# Patient Record
Sex: MALE | Race: WHITE | Employment: OTHER | URBAN - METROPOLITAN AREA
[De-identification: names, ages, dates, MRNs, and addresses within clinical notes are randomized per-mention and may not be internally consistent; named-entity substitution may affect disease eponyms.]

---

## 2020-06-03 ENCOUNTER — HOSPITAL ENCOUNTER (OUTPATIENT)
Age: 65
Discharge: HOME | End: 2020-06-03
Attending: EMERGENCY MEDICINE
Payer: MEDICARE

## 2020-06-03 VITALS
HEART RATE: 75 BPM | TEMPERATURE: 98 F | SYSTOLIC BLOOD PRESSURE: 114 MMHG | OXYGEN SATURATION: 96 % | RESPIRATION RATE: 16 BRPM | DIASTOLIC BLOOD PRESSURE: 80 MMHG

## 2020-06-03 DIAGNOSIS — H61.23 BILATERAL IMPACTED CERUMEN: Primary | ICD-10-CM

## 2020-06-03 DIAGNOSIS — S61.214A LACERATION OF RIGHT RING FINGER WITHOUT FOREIGN BODY WITHOUT DAMAGE TO NAIL, INITIAL ENCOUNTER: ICD-10-CM

## 2020-06-03 PROCEDURE — 69210 REMOVE IMPACTED EAR WAX UNI: CPT

## 2020-06-03 NOTE — ED PROVIDER NOTES
Patient Seen in: [facility] Immediate Care In [address] [place] [address]      History   Patient presents with:  Ear Problem Pain    Stated Complaint: EAR PAIN    HPI  Patient wears bilateral hearing aids and complains they do not seem to be working as well.   He wou Conjunctivae normal.   Neck:      Musculoskeletal: Normal range of motion and neck supple. Pulmonary:      Effort: Pulmonary effort is normal.   Musculoskeletal: Normal range of motion. Lymphadenopathy:      Cervical: No cervical adenopathy.    Skin:

## 2020-06-04 ENCOUNTER — TELEPHONE (OUTPATIENT)
Dept: OTOLARYNGOLOGY | Facility: CLINIC | Age: 65
End: 2020-06-04

## 2020-06-04 NOTE — TELEPHONE ENCOUNTER
Pt's wife called. Pt went to urgent care 6-3-20 for bilateral impacted cerumen. Pt can not hear. Pt does wear hearing aids. Caller requesting an appointment as soon as possible with any doctor.   Call

## 2020-06-08 ENCOUNTER — OFFICE VISIT (OUTPATIENT)
Dept: OTOLARYNGOLOGY | Facility: CLINIC | Age: 65
End: 2020-06-08
Payer: MEDICARE

## 2020-06-08 VITALS
SYSTOLIC BLOOD PRESSURE: 149 MMHG | WEIGHT: 300 LBS | TEMPERATURE: 97 F | DIASTOLIC BLOOD PRESSURE: 89 MMHG | HEIGHT: 69 IN | BODY MASS INDEX: 44.43 KG/M2

## 2020-06-08 DIAGNOSIS — H61.23 BILATERAL IMPACTED CERUMEN: Primary | ICD-10-CM

## 2020-06-08 DIAGNOSIS — H60.331 ACUTE SWIMMER'S EAR OF RIGHT SIDE: ICD-10-CM

## 2020-06-08 PROCEDURE — 69210 REMOVE IMPACTED EAR WAX UNI: CPT | Performed by: OTOLARYNGOLOGY

## 2020-06-08 PROCEDURE — G0463 HOSPITAL OUTPT CLINIC VISIT: HCPCS | Performed by: OTOLARYNGOLOGY

## 2020-06-08 PROCEDURE — 99203 OFFICE O/P NEW LOW 30 MIN: CPT | Performed by: OTOLARYNGOLOGY

## 2020-06-08 RX ORDER — NEOMYCIN SULFATE, POLYMYXIN B SULFATE AND HYDROCORTISONE 10; 3.5; 1 MG/ML; MG/ML; [USP'U]/ML
3 SUSPENSION/ DROPS AURICULAR (OTIC) 3 TIMES DAILY
Qty: 1 BOTTLE | Refills: 0 | Status: SHIPPED | OUTPATIENT
Start: 2020-06-08 | End: 2020-06-15

## 2020-06-08 NOTE — PROGRESS NOTES
Jenna Barrera is a 72year old male. Patient presents with:  Ear Wax: both ears      HISTORY OF PRESENT ILLNESS  6/8/2020  Patient  presents with ear pain in the right ears for4 days.   His hearing aids were not working well so he went into an immediate abused: Not on file        Physically abused: Not on file        Forced sexual activity: Not on file    Other Topics      Concerns:        Not on file    Social History Narrative      Not on file      No family history on file.   No past medical history on to be normal. Patient tolerated the procedure well. All questions were answered. Current Outpatient Medications:   •  Neomycin-Polymyxin-HC 3.5-53100-4 Otic Suspension, Place 3 drops into the right ear 3 (three) times daily for 7 days. , Disp: 1 [facility]

## 2020-07-23 ENCOUNTER — OFFICE VISIT (OUTPATIENT)
Dept: INTERNAL MEDICINE CLINIC | Facility: CLINIC | Age: 65
End: 2020-07-23
Payer: MEDICARE

## 2020-07-23 VITALS
OXYGEN SATURATION: 99 % | BODY MASS INDEX: 46.89 KG/M2 | HEIGHT: 68.4 IN | TEMPERATURE: 98 F | HEART RATE: 72 BPM | SYSTOLIC BLOOD PRESSURE: 124 MMHG | DIASTOLIC BLOOD PRESSURE: 70 MMHG | WEIGHT: 313 LBS

## 2020-07-23 DIAGNOSIS — Z00.00 ANNUAL PHYSICAL EXAM: Primary | ICD-10-CM

## 2020-07-23 DIAGNOSIS — R53.83 FATIGUE, UNSPECIFIED TYPE: ICD-10-CM

## 2020-07-23 DIAGNOSIS — E66.01 CLASS 3 SEVERE OBESITY DUE TO EXCESS CALORIES WITHOUT SERIOUS COMORBIDITY WITH BODY MASS INDEX (BMI) OF 45.0 TO 49.9 IN ADULT (HCC): ICD-10-CM

## 2020-07-23 DIAGNOSIS — J30.2 SEASONAL ALLERGIES: ICD-10-CM

## 2020-07-23 DIAGNOSIS — Z12.5 PROSTATE CANCER SCREENING: ICD-10-CM

## 2020-07-23 DIAGNOSIS — N43.3 RIGHT HYDROCELE: ICD-10-CM

## 2020-07-23 PROBLEM — E66.813 CLASS 3 SEVERE OBESITY DUE TO EXCESS CALORIES WITHOUT SERIOUS COMORBIDITY WITH BODY MASS INDEX (BMI) OF 45.0 TO 49.9 IN ADULT (HCC): Status: ACTIVE | Noted: 2020-07-23

## 2020-07-23 PROBLEM — E66.813 CLASS 3 SEVERE OBESITY DUE TO EXCESS CALORIES WITHOUT SERIOUS COMORBIDITY WITH BODY MASS INDEX (BMI) OF 45.0 TO 49.9 IN ADULT: Status: ACTIVE | Noted: 2020-07-23

## 2020-07-23 LAB
OCCULT BLOOD, STOOL 1: NEGATIVE
PERFORMANCE MONITORS CORRECT (YES/NO): YES YES/NO

## 2020-07-23 PROCEDURE — 82272 OCCULT BLD FECES 1-3 TESTS: CPT | Performed by: INTERNAL MEDICINE

## 2020-07-23 PROCEDURE — G0463 HOSPITAL OUTPT CLINIC VISIT: HCPCS | Performed by: INTERNAL MEDICINE

## 2020-07-23 PROCEDURE — 99204 OFFICE O/P NEW MOD 45 MIN: CPT | Performed by: INTERNAL MEDICINE

## 2020-07-23 PROCEDURE — G0402 INITIAL PREVENTIVE EXAM: HCPCS | Performed by: INTERNAL MEDICINE

## 2020-07-23 RX ORDER — MULTIVIT WITH MINERALS/LUTEIN
1000 TABLET ORAL DAILY
COMMUNITY

## 2020-07-23 NOTE — PATIENT INSTRUCTIONS
1.  Patient is to continue his current diet, medication and activities. 2.  Patient is to watch his diet more closely, continue to get some exercise and attempt to lose weight.   3.  I will plan to see the patient back in 1 to 2 months with blood tests, ur

## 2020-07-24 NOTE — PROGRESS NOTES
Tracy Guzman is a 72year old male who presents for a complete physical exam.   HPI:   Mr Dennie Simmers. [place] is a 71 y/o white male who was seen by me on July 22, 2020 for his initial Medicare Annual Physical exam.  Pt feels that he has not had a physic GASTRIC BYPASS,OBESE<100CM STEPHANI-EN-Y  2010      Family History   Problem Relation Age of Onset   • Stroke Father    • Cancer Mother    • Dementia Mother    • Dementia Maternal Grandfather    • Depression Paternal Grandmother    • Other (Other) Brother nodules  MUSCULOSKELETAL: back is not tender. No pain or swelling of legs  EXTREMITIES:No edema. All peripheral pulses are intact. NEURO:Alert and oriented, CN are intact, DTRs are 1+ Bilaterally with absent Achilles reflexes bilaterally.     Pt will hav Fall/Risk Assessment          Have you fallen in the last 12 months?: 0-No(3 weeks ago)                                        Fall/Risk Scorin          Depression Screening (PHQ-2/PHQ-9): Over the LAST 2 WEEKS                      Advance Directiv Influenza No orders found for this or any previous visit. Update Immunization Activity if applicable    Pneumococcal No orders found for this or any previous visit.  Update Immunization Activity if applicable    Hepatitis B No orders found for this or any p

## 2020-08-25 ENCOUNTER — LAB ENCOUNTER (OUTPATIENT)
Dept: LAB | Age: 65
End: 2020-08-25
Attending: INTERNAL MEDICINE
Payer: MEDICARE

## 2020-08-25 DIAGNOSIS — R53.83 FATIGUE, UNSPECIFIED TYPE: ICD-10-CM

## 2020-08-25 DIAGNOSIS — E66.01 CLASS 3 SEVERE OBESITY DUE TO EXCESS CALORIES WITHOUT SERIOUS COMORBIDITY WITH BODY MASS INDEX (BMI) OF 45.0 TO 49.9 IN ADULT (HCC): ICD-10-CM

## 2020-08-25 DIAGNOSIS — Z12.5 PROSTATE CANCER SCREENING: ICD-10-CM

## 2020-08-25 DIAGNOSIS — Z00.00 ANNUAL PHYSICAL EXAM: ICD-10-CM

## 2020-08-25 LAB
ALBUMIN SERPL-MCNC: 3.6 G/DL (ref 3.4–5)
ALBUMIN/GLOB SERPL: 1 {RATIO} (ref 1–2)
ALP LIVER SERPL-CCNC: 79 U/L (ref 45–117)
ALT SERPL-CCNC: 27 U/L (ref 16–61)
ANION GAP SERPL CALC-SCNC: 2 MMOL/L (ref 0–18)
AST SERPL-CCNC: 20 U/L (ref 15–37)
BASOPHILS # BLD AUTO: 0.02 X10(3) UL (ref 0–0.2)
BASOPHILS NFR BLD AUTO: 0.4 %
BILIRUB SERPL-MCNC: 0.4 MG/DL (ref 0.1–2)
BILIRUB UR QL: NEGATIVE
BUN BLD-MCNC: 14 MG/DL (ref 7–18)
BUN/CREAT SERPL: 18.7 (ref 10–20)
CALCIUM BLD-MCNC: 8.4 MG/DL (ref 8.5–10.1)
CHLORIDE SERPL-SCNC: 109 MMOL/L (ref 98–112)
CHOLEST SMN-MCNC: 119 MG/DL (ref ?–200)
CLARITY UR: CLEAR
CO2 SERPL-SCNC: 29 MMOL/L (ref 21–32)
COLOR UR: YELLOW
COMPLEXED PSA SERPL-MCNC: 0.82 NG/ML (ref ?–4)
CREAT BLD-MCNC: 0.75 MG/DL (ref 0.7–1.3)
DEPRECATED RDW RBC AUTO: 41.2 FL (ref 35.1–46.3)
EOSINOPHIL # BLD AUTO: 0.14 X10(3) UL (ref 0–0.7)
EOSINOPHIL NFR BLD AUTO: 2.8 %
ERYTHROCYTE [DISTWIDTH] IN BLOOD BY AUTOMATED COUNT: 14 % (ref 11–15)
GLOBULIN PLAS-MCNC: 3.5 G/DL (ref 2.8–4.4)
GLUCOSE BLD-MCNC: 86 MG/DL (ref 70–99)
GLUCOSE UR-MCNC: NEGATIVE MG/DL
HCT VFR BLD AUTO: 35.8 % (ref 39–53)
HDLC SERPL-MCNC: 47 MG/DL (ref 40–59)
HGB BLD-MCNC: 11.1 G/DL (ref 13–17.5)
HGB UR QL STRIP.AUTO: NEGATIVE
IMM GRANULOCYTES # BLD AUTO: 0.02 X10(3) UL (ref 0–1)
IMM GRANULOCYTES NFR BLD: 0.4 %
KETONES UR-MCNC: NEGATIVE MG/DL
LDLC SERPL CALC-MCNC: 53 MG/DL (ref ?–100)
LEUKOCYTE ESTERASE UR QL STRIP.AUTO: NEGATIVE
LYMPHOCYTES # BLD AUTO: 1.77 X10(3) UL (ref 1–4)
LYMPHOCYTES NFR BLD AUTO: 35.4 %
M PROTEIN MFR SERPL ELPH: 7.1 G/DL (ref 6.4–8.2)
MCH RBC QN AUTO: 25.1 PG (ref 26–34)
MCHC RBC AUTO-ENTMCNC: 31 G/DL (ref 31–37)
MCV RBC AUTO: 80.8 FL (ref 80–100)
MONOCYTES # BLD AUTO: 0.58 X10(3) UL (ref 0.1–1)
MONOCYTES NFR BLD AUTO: 11.6 %
NEUTROPHILS # BLD AUTO: 2.47 X10 (3) UL (ref 1.5–7.7)
NEUTROPHILS # BLD AUTO: 2.47 X10(3) UL (ref 1.5–7.7)
NEUTROPHILS NFR BLD AUTO: 49.4 %
NITRITE UR QL STRIP.AUTO: NEGATIVE
NONHDLC SERPL-MCNC: 72 MG/DL (ref ?–130)
OSMOLALITY SERPL CALC.SUM OF ELEC: 290 MOSM/KG (ref 275–295)
PATIENT FASTING Y/N/NP: YES
PATIENT FASTING Y/N/NP: YES
PH UR: 6 [PH] (ref 5–8)
PLATELET # BLD AUTO: 229 10(3)UL (ref 150–450)
POTASSIUM SERPL-SCNC: 4.2 MMOL/L (ref 3.5–5.1)
PROT UR-MCNC: NEGATIVE MG/DL
RBC # BLD AUTO: 4.43 X10(6)UL (ref 3.8–5.8)
SODIUM SERPL-SCNC: 140 MMOL/L (ref 136–145)
SP GR UR STRIP: 1.01 (ref 1–1.03)
TRIGL SERPL-MCNC: 95 MG/DL (ref 30–149)
TSI SER-ACNC: 2.21 MIU/ML (ref 0.36–3.74)
UROBILINOGEN UR STRIP-ACNC: <2
VLDLC SERPL CALC-MCNC: 19 MG/DL (ref 0–30)
WBC # BLD AUTO: 5 X10(3) UL (ref 4–11)

## 2020-08-25 PROCEDURE — 80061 LIPID PANEL: CPT

## 2020-08-25 PROCEDURE — 80053 COMPREHEN METABOLIC PANEL: CPT

## 2020-08-25 PROCEDURE — 81003 URINALYSIS AUTO W/O SCOPE: CPT | Performed by: INTERNAL MEDICINE

## 2020-08-25 PROCEDURE — 85025 COMPLETE CBC W/AUTO DIFF WBC: CPT

## 2020-08-25 PROCEDURE — 84443 ASSAY THYROID STIM HORMONE: CPT

## 2020-08-25 PROCEDURE — 36415 COLL VENOUS BLD VENIPUNCTURE: CPT

## 2020-08-26 ENCOUNTER — LAB ENCOUNTER (OUTPATIENT)
Dept: LAB | Age: 65
End: 2020-08-26
Attending: INTERNAL MEDICINE
Payer: MEDICARE

## 2020-08-26 ENCOUNTER — OFFICE VISIT (OUTPATIENT)
Dept: INTERNAL MEDICINE CLINIC | Facility: CLINIC | Age: 65
End: 2020-08-26
Payer: MEDICARE

## 2020-08-26 VITALS
TEMPERATURE: 99 F | DIASTOLIC BLOOD PRESSURE: 76 MMHG | HEART RATE: 64 BPM | SYSTOLIC BLOOD PRESSURE: 120 MMHG | WEIGHT: 315 LBS | BODY MASS INDEX: 47.74 KG/M2 | HEIGHT: 68 IN | OXYGEN SATURATION: 99 %

## 2020-08-26 DIAGNOSIS — Z00.00 ANNUAL PHYSICAL EXAM: ICD-10-CM

## 2020-08-26 DIAGNOSIS — J30.2 SEASONAL ALLERGIES: ICD-10-CM

## 2020-08-26 DIAGNOSIS — D64.9 ANEMIA, UNSPECIFIED TYPE: ICD-10-CM

## 2020-08-26 DIAGNOSIS — E66.01 CLASS 3 SEVERE OBESITY DUE TO EXCESS CALORIES WITHOUT SERIOUS COMORBIDITY WITH BODY MASS INDEX (BMI) OF 45.0 TO 49.9 IN ADULT (HCC): Primary | ICD-10-CM

## 2020-08-26 DIAGNOSIS — R53.83 FATIGUE, UNSPECIFIED TYPE: ICD-10-CM

## 2020-08-26 DIAGNOSIS — N43.3 RIGHT HYDROCELE: ICD-10-CM

## 2020-08-26 LAB
BASOPHILS # BLD AUTO: 0.05 X10(3) UL (ref 0–0.2)
BASOPHILS NFR BLD AUTO: 0.7 %
DEPRECATED HBV CORE AB SER IA-ACNC: 6.4 NG/ML (ref 30–530)
DEPRECATED RDW RBC AUTO: 41.9 FL (ref 35.1–46.3)
EOSINOPHIL # BLD AUTO: 0.11 X10(3) UL (ref 0–0.7)
EOSINOPHIL NFR BLD AUTO: 1.6 %
ERYTHROCYTE [DISTWIDTH] IN BLOOD BY AUTOMATED COUNT: 14.3 % (ref 11–15)
FOLATE SERPL-MCNC: 12.6 NG/ML (ref 8.7–?)
HCT VFR BLD AUTO: 37.6 % (ref 39–53)
HGB BLD-MCNC: 11.7 G/DL (ref 13–17.5)
IMM GRANULOCYTES # BLD AUTO: 0.03 X10(3) UL (ref 0–1)
IMM GRANULOCYTES NFR BLD: 0.4 %
IRON SATURATION: 7 % (ref 20–50)
IRON SERPL-MCNC: 33 UG/DL (ref 65–175)
LYMPHOCYTES # BLD AUTO: 2.55 X10(3) UL (ref 1–4)
LYMPHOCYTES NFR BLD AUTO: 38.1 %
MCH RBC QN AUTO: 25.3 PG (ref 26–34)
MCHC RBC AUTO-ENTMCNC: 31.1 G/DL (ref 31–37)
MCV RBC AUTO: 81.2 FL (ref 80–100)
MONOCYTES # BLD AUTO: 0.69 X10(3) UL (ref 0.1–1)
MONOCYTES NFR BLD AUTO: 10.3 %
NEUTROPHILS # BLD AUTO: 3.27 X10 (3) UL (ref 1.5–7.7)
NEUTROPHILS # BLD AUTO: 3.27 X10(3) UL (ref 1.5–7.7)
NEUTROPHILS NFR BLD AUTO: 48.9 %
PLATELET # BLD AUTO: 231 10(3)UL (ref 150–450)
RBC # BLD AUTO: 4.63 X10(6)UL (ref 3.8–5.8)
TOTAL IRON BINDING CAPACITY: 463 UG/DL (ref 240–450)
TRANSFERRIN SERPL-MCNC: 311 MG/DL (ref 200–360)
VIT B12 SERPL-MCNC: 174 PG/ML (ref 193–986)
WBC # BLD AUTO: 6.7 X10(3) UL (ref 4–11)

## 2020-08-26 PROCEDURE — 85025 COMPLETE CBC W/AUTO DIFF WBC: CPT

## 2020-08-26 PROCEDURE — 36415 COLL VENOUS BLD VENIPUNCTURE: CPT

## 2020-08-26 PROCEDURE — 93010 ELECTROCARDIOGRAM REPORT: CPT | Performed by: INTERNAL MEDICINE

## 2020-08-26 PROCEDURE — 84466 ASSAY OF TRANSFERRIN: CPT

## 2020-08-26 PROCEDURE — G0463 HOSPITAL OUTPT CLINIC VISIT: HCPCS | Performed by: INTERNAL MEDICINE

## 2020-08-26 PROCEDURE — 82607 VITAMIN B-12: CPT

## 2020-08-26 PROCEDURE — 99214 OFFICE O/P EST MOD 30 MIN: CPT | Performed by: INTERNAL MEDICINE

## 2020-08-26 PROCEDURE — 82728 ASSAY OF FERRITIN: CPT

## 2020-08-26 PROCEDURE — 83540 ASSAY OF IRON: CPT

## 2020-08-26 PROCEDURE — 93005 ELECTROCARDIOGRAM TRACING: CPT | Performed by: INTERNAL MEDICINE

## 2020-08-26 PROCEDURE — 82746 ASSAY OF FOLIC ACID SERUM: CPT

## 2020-08-26 NOTE — PATIENT INSTRUCTIONS
1.  Patient is to continue his current diet, medication and activity. 2.  I will obtain blood test to check on the patient's anemia which will include a CBC, iron, iron-binding capacity, ferritin level, G26 level and folic acid level.   I will discuss the

## 2020-08-26 NOTE — PROGRESS NOTES
Izzy Flower is a 72year old male. Patient presents with:  Checkup  Fatigue    HPI:   Patient presents with:  Checkup  Fatigue    Pt is here for follow up evaluation.   Pt was seen last month for his initial Medicare Annual Physical.  Pt is seen today f organomegaly or palpable masses  EXTREMITIES: no edema  NEURO: alert and oriented  ASSESSMENT AND PLAN:   1. Class 3 severe obesity due to excess calories without serious comorbidity with body mass index (BMI) of 45.0 to 49.9 in adult (HCC)  Stable.   CPM. shown his hemoglobin to be 11.1, hematocrit to be 35.8 and WBC to be 5000. Patient's platelet count was 2 and 29,000.   Patient's CMP was normal.  Patient's urinalysis was normal.  Patient's lipid panel had a cholesterol 119, triglycerides were 95, HDL cho

## 2021-01-18 ENCOUNTER — TELEPHONE (OUTPATIENT)
Dept: INTERNAL MEDICINE CLINIC | Facility: CLINIC | Age: 66
End: 2021-01-18

## 2021-01-18 NOTE — TELEPHONE ENCOUNTER
Telephone call to patient and message left. I left a message for the patient that he had blood tests last August which showed him to have an anemia which appeared to be an iron deficiency anemia. Also his vitamin B12 level is low.   I had previously recom

## 2021-03-13 DIAGNOSIS — Z23 NEED FOR VACCINATION: ICD-10-CM

## 2022-03-16 ENCOUNTER — TELEPHONE (OUTPATIENT)
Dept: INTERNAL MEDICINE CLINIC | Facility: CLINIC | Age: 67
End: 2022-03-16

## 2022-03-16 DIAGNOSIS — Z12.11 COLON CANCER SCREENING: Primary | ICD-10-CM

## 2022-10-20 ENCOUNTER — HOSPITAL ENCOUNTER (OUTPATIENT)
Age: 67
Discharge: HOME | End: 2022-10-20
Payer: MEDICARE

## 2022-10-20 VITALS
HEIGHT: 68 IN | OXYGEN SATURATION: 99 % | RESPIRATION RATE: 18 BRPM | SYSTOLIC BLOOD PRESSURE: 149 MMHG | WEIGHT: 315 LBS | TEMPERATURE: 98 F | DIASTOLIC BLOOD PRESSURE: 66 MMHG | BODY MASS INDEX: 47.74 KG/M2 | HEART RATE: 98 BPM

## 2022-10-20 DIAGNOSIS — R09.81 NASAL CONGESTION: Primary | ICD-10-CM

## 2022-10-20 DIAGNOSIS — H92.01 EAR DISCOMFORT, RIGHT: ICD-10-CM

## 2022-10-20 PROCEDURE — 99203 OFFICE O/P NEW LOW 30 MIN: CPT | Performed by: PHYSICIAN ASSISTANT

## 2022-10-20 RX ORDER — AMOXICILLIN AND CLAVULANATE POTASSIUM 875; 125 MG/1; MG/1
1 TABLET, FILM COATED ORAL 2 TIMES DAILY
Qty: 20 TABLET | Refills: 0 | Status: SHIPPED | OUTPATIENT
Start: 2022-10-22 | End: 2022-11-01

## 2022-10-20 RX ORDER — FLUTICASONE PROPIONATE 50 MCG
2 SPRAY, SUSPENSION (ML) NASAL DAILY
Qty: 16 G | Refills: 0 | Status: SHIPPED | OUTPATIENT
Start: 2022-10-20 | End: 2022-11-19

## 2022-10-20 RX ORDER — FEXOFENADINE HYDROCHLORIDE 60 MG/1
60 TABLET, FILM COATED ORAL 2 TIMES DAILY
Qty: 20 TABLET | Refills: 0 | Status: SHIPPED | OUTPATIENT
Start: 2022-10-20 | End: 2022-10-30

## 2022-11-21 ENCOUNTER — TELEPHONE (OUTPATIENT)
Dept: INTERNAL MEDICINE CLINIC | Facility: CLINIC | Age: 67
End: 2022-11-21

## 2022-11-21 NOTE — TELEPHONE ENCOUNTER
To nursing-  Because he reported difficulty breathing, he needs to go to ER today. He should not wait till tomorrow especially since he is in the post COVID time where complications can happen pulmonary and cardiac wise that he should be evaluated today. Thanks. patient has not seen Dr. Karen Diaz since 2020.

## 2022-11-21 NOTE — TELEPHONE ENCOUNTER
Wife called  Pt needs to be seen for vertigo    Pt is dizzy when climbing stairs and has difficulty breathing & sleeping    Can't sleep due to breathing issues    Pt was seen in the ER, completed antibiotics that were prescribed - he is not any better     Please call wife, Enma Tabares to advise on appointment or next steps  [phone]

## 2022-11-21 NOTE — TELEPHONE ENCOUNTER
To Dr. Twyla Lundborg to please advise----    Spoke with pt Myles [place] not listed on HIPAA). Confirmed that he does not want us to share medical information with her, states she is his ex-wife now. Reports he had covid about 2 months ago, his only symptom was fever. Then developed ear infection and sinusitis. Was seen at [facility] 10/20/22 and pt completed augmentin. Reports symptoms did not resolve. Still having nasal congestion, right ear pain/fullness, and dizziness. Dizziness has been ongoing since UC visit. No known history of vertigo. Reports he feels like he is moving even when sitting. Makes his stomach feel upset at times. Denies any blurred vision, arm pain, chest pain, or SOB. Having difficulty sleeping. Taking cetirizine. Advised that pt should be seen. Minimal openings in office this week. He is a pt of Dr. Sania Stearns. Dr. Twyla Lundborg please advise if you are ok with seeing pt tomorrow in Dr. Sania Stearns absence. Scheduled for 2:30 to hold spot. Nursing to notify pt if appt is ok, or cancel appt if not. Notified patient that we will route this message to the doctor and see what their recommendations would be. In the meantime, if anything worsens, they were advised to call back or seek emergent evaluation.

## 2022-11-21 NOTE — TELEPHONE ENCOUNTER
Please advise -Called patient and daughter per hipaa and relayed DR. BLANC message - spoke with patient and relayed DR. BLANC message - patient not sure if he will go to ER - do you want to schedule an primo. ?- to DR. Sarina Yanez

## 2022-11-22 ENCOUNTER — OFFICE VISIT (OUTPATIENT)
Dept: INTERNAL MEDICINE CLINIC | Facility: CLINIC | Age: 67
End: 2022-11-22
Payer: MEDICARE

## 2022-11-22 VITALS
OXYGEN SATURATION: 96 % | HEART RATE: 104 BPM | HEIGHT: 68 IN | DIASTOLIC BLOOD PRESSURE: 80 MMHG | BODY MASS INDEX: 47.74 KG/M2 | SYSTOLIC BLOOD PRESSURE: 140 MMHG | WEIGHT: 315 LBS

## 2022-11-22 DIAGNOSIS — H72.91 SEROUS OTITIS MEDIA OF RIGHT EAR WITH RUPTURE OF TYMPANIC MEMBRANE: Primary | ICD-10-CM

## 2022-11-22 DIAGNOSIS — R42 DYSEQUILIBRIUM: ICD-10-CM

## 2022-11-22 DIAGNOSIS — H65.91 SEROUS OTITIS MEDIA OF RIGHT EAR WITH RUPTURE OF TYMPANIC MEMBRANE: Primary | ICD-10-CM

## 2022-11-22 PROCEDURE — 99213 OFFICE O/P EST LOW 20 MIN: CPT | Performed by: INTERNAL MEDICINE

## 2022-11-22 RX ORDER — PREDNISONE 20 MG/1
40 TABLET ORAL DAILY
Qty: 10 TABLET | Refills: 0 | Status: SHIPPED | OUTPATIENT
Start: 2022-11-22 | End: 2022-11-27

## 2022-11-28 ENCOUNTER — TELEPHONE (OUTPATIENT)
Dept: INTERNAL MEDICINE CLINIC | Facility: CLINIC | Age: 67
End: 2022-11-28

## 2022-11-28 NOTE — TELEPHONE ENCOUNTER
Please call patient wife  They are looking for recommendation for an ENT  Pt was seen in office last week, was told if condition did not improve with medication.  call office for recommendation for ENT  Tasked to nursing

## 2022-11-29 ENCOUNTER — OFFICE VISIT (OUTPATIENT)
Dept: OTOLARYNGOLOGY | Facility: CLINIC | Age: 67
End: 2022-11-29
Payer: MEDICARE

## 2022-11-29 VITALS — HEIGHT: 68 IN | WEIGHT: 315 LBS | TEMPERATURE: 99 F | BODY MASS INDEX: 47.74 KG/M2

## 2022-11-29 DIAGNOSIS — R42 VERTIGO: Primary | ICD-10-CM

## 2022-11-29 DIAGNOSIS — M26.609 TMJ (TEMPOROMANDIBULAR JOINT DISORDER): ICD-10-CM

## 2022-11-29 PROCEDURE — 92504 EAR MICROSCOPY EXAMINATION: CPT | Performed by: STUDENT IN AN ORGANIZED HEALTH CARE EDUCATION/TRAINING PROGRAM

## 2022-11-29 PROCEDURE — 99214 OFFICE O/P EST MOD 30 MIN: CPT | Performed by: STUDENT IN AN ORGANIZED HEALTH CARE EDUCATION/TRAINING PROGRAM

## 2023-02-10 ENCOUNTER — PATIENT MESSAGE (OUTPATIENT)
Dept: INTERNAL MEDICINE CLINIC | Facility: CLINIC | Age: 68
End: 2023-02-10

## 2023-02-20 ENCOUNTER — PATIENT OUTREACH (OUTPATIENT)
Dept: INTERNAL MEDICINE CLINIC | Facility: CLINIC | Age: 68
End: 2023-02-20

## 2023-08-18 ENCOUNTER — HOSPITAL ENCOUNTER (EMERGENCY)
Facility: HOSPITAL | Age: 68
Discharge: HOME | End: 2023-08-18
Attending: EMERGENCY MEDICINE
Payer: MEDICARE

## 2023-08-18 ENCOUNTER — TELEPHONE (OUTPATIENT)
Dept: INTERNAL MEDICINE CLINIC | Facility: CLINIC | Age: 68
End: 2023-08-18

## 2023-08-18 ENCOUNTER — APPOINTMENT (OUTPATIENT)
Dept: CT IMAGING | Facility: HOSPITAL | Age: 68
End: 2023-08-18
Attending: EMERGENCY MEDICINE
Payer: MEDICARE

## 2023-08-18 ENCOUNTER — APPOINTMENT (OUTPATIENT)
Dept: MRI IMAGING | Facility: HOSPITAL | Age: 68
End: 2023-08-18
Attending: EMERGENCY MEDICINE
Payer: MEDICARE

## 2023-08-18 VITALS
OXYGEN SATURATION: 98 % | BODY MASS INDEX: 47.74 KG/M2 | SYSTOLIC BLOOD PRESSURE: 151 MMHG | TEMPERATURE: 98 F | RESPIRATION RATE: 20 BRPM | WEIGHT: 315 LBS | HEART RATE: 67 BPM | DIASTOLIC BLOOD PRESSURE: 70 MMHG | HEIGHT: 68 IN

## 2023-08-18 DIAGNOSIS — R47.89 WORD FINDING DIFFICULTY: Primary | ICD-10-CM

## 2023-08-18 LAB
ANION GAP SERPL CALC-SCNC: 7 MMOL/L (ref 0–18)
BASOPHILS # BLD AUTO: 0.06 X10(3) UL (ref 0–0.2)
BASOPHILS NFR BLD AUTO: 0.9 %
BUN BLD-MCNC: 16 MG/DL (ref 7–18)
BUN/CREAT SERPL: 19.3 (ref 10–20)
CALCIUM BLD-MCNC: 8.8 MG/DL (ref 8.5–10.1)
CHLORIDE SERPL-SCNC: 109 MMOL/L (ref 98–112)
CO2 SERPL-SCNC: 24 MMOL/L (ref 21–32)
CREAT BLD-MCNC: 0.83 MG/DL
DEPRECATED RDW RBC AUTO: 40.4 FL (ref 35.1–46.3)
EGFRCR SERPLBLD CKD-EPI 2021: 95 ML/MIN/1.73M2 (ref 60–?)
EOSINOPHIL # BLD AUTO: 0.16 X10(3) UL (ref 0–0.7)
EOSINOPHIL NFR BLD AUTO: 2.4 %
ERYTHROCYTE [DISTWIDTH] IN BLOOD BY AUTOMATED COUNT: 14.5 % (ref 11–15)
GLUCOSE BLD-MCNC: 130 MG/DL (ref 70–99)
GLUCOSE BLDC GLUCOMTR-MCNC: 136 MG/DL (ref 70–99)
HCT VFR BLD AUTO: 36.3 %
HGB BLD-MCNC: 11.3 G/DL
IMM GRANULOCYTES # BLD AUTO: 0.02 X10(3) UL (ref 0–1)
IMM GRANULOCYTES NFR BLD: 0.3 %
LYMPHOCYTES # BLD AUTO: 2.26 X10(3) UL (ref 1–4)
LYMPHOCYTES NFR BLD AUTO: 34.6 %
MCH RBC QN AUTO: 24.1 PG (ref 26–34)
MCHC RBC AUTO-ENTMCNC: 31.1 G/DL (ref 31–37)
MCV RBC AUTO: 77.4 FL
MONOCYTES # BLD AUTO: 0.62 X10(3) UL (ref 0.1–1)
MONOCYTES NFR BLD AUTO: 9.5 %
NEUTROPHILS # BLD AUTO: 3.42 X10 (3) UL (ref 1.5–7.7)
NEUTROPHILS # BLD AUTO: 3.42 X10(3) UL (ref 1.5–7.7)
NEUTROPHILS NFR BLD AUTO: 52.3 %
OSMOLALITY SERPL CALC.SUM OF ELEC: 293 MOSM/KG (ref 275–295)
PLATELET # BLD AUTO: 255 10(3)UL (ref 150–450)
POTASSIUM SERPL-SCNC: 3.9 MMOL/L (ref 3.5–5.1)
RBC # BLD AUTO: 4.69 X10(6)UL
SODIUM SERPL-SCNC: 140 MMOL/L (ref 136–145)
WBC # BLD AUTO: 6.5 X10(3) UL (ref 4–11)

## 2023-08-18 PROCEDURE — 93005 ELECTROCARDIOGRAM TRACING: CPT

## 2023-08-18 PROCEDURE — 82962 GLUCOSE BLOOD TEST: CPT

## 2023-08-18 PROCEDURE — 70551 MRI BRAIN STEM W/O DYE: CPT | Performed by: EMERGENCY MEDICINE

## 2023-08-18 PROCEDURE — 80048 BASIC METABOLIC PNL TOTAL CA: CPT | Performed by: EMERGENCY MEDICINE

## 2023-08-18 PROCEDURE — 99284 EMERGENCY DEPT VISIT MOD MDM: CPT

## 2023-08-18 PROCEDURE — 36415 COLL VENOUS BLD VENIPUNCTURE: CPT

## 2023-08-18 PROCEDURE — 93010 ELECTROCARDIOGRAM REPORT: CPT

## 2023-08-18 PROCEDURE — 85025 COMPLETE CBC W/AUTO DIFF WBC: CPT | Performed by: EMERGENCY MEDICINE

## 2023-08-18 PROCEDURE — 70498 CT ANGIOGRAPHY NECK: CPT | Performed by: EMERGENCY MEDICINE

## 2023-08-18 NOTE — ED INITIAL ASSESSMENT (HPI)
Patient arrives from home with reports of being at a meeting on Monday and not being able to coherently speak or understand speech. Wife provides history. States they believed he was having a panic attack on Monday and that is why they did not seek medical attention.

## 2023-08-18 NOTE — DISCHARGE INSTRUCTIONS
See primary care for follow-up. As we discussed you may need to see neurology for follow-up appointment as well. Return to the ER if you develop worsening symptoms, weakness or numbness on one side your body, or any emergent concerns.

## 2023-08-18 NOTE — TELEPHONE ENCOUNTER
Per chart review, no evidence of ER evaluation. Called and spoke with becky Benito per hipaa, she states they are parking at ER now.      Nursing to f/u

## 2023-08-18 NOTE — TELEPHONE ENCOUNTER
Spoke with becky Benito per HIPAA. She suspects pt had a panic attack on Monday, 8/14, during a deposition where he was being questioned. Pt exhibited loss of speech and had trouble understanding others. He took 3 breaks during 1 hour and became totally non-verbal.   Since then, pt has been unable to complete sentence and has scattered speech with random responses. Pt has been only completing basic tasks like showering and doing dishes,     No signs of weakness or vision changes. States she is afraid to let the pt drive to his dentist appointment today. Advised pt needs emergent evaluation in the ER now. Advised pt may have had a stroke. Jigna states this is likely stress related, advised pt will need an evaluation to determine. Jigna agrees with plan, states pt may not be agreeable to go to ER but she will try to get him to [address] [place] [address] ER now. Nursing to F/U.

## 2023-08-18 NOTE — TELEPHONE ENCOUNTER
Please call patient spouse, Hanna Boston   She is hoping to schedule patient with Dr Tony Martinez for next week, no appointments available  Patient was in a stressful event this week, like a panic attack  Since then patient has been a bit confused, cloudy  Feel that patient should be seen in the office, not ER  Can patient be added to the schedule for next week?   Tasked to nursing

## 2023-08-19 LAB
ATRIAL RATE: 67 BPM
P AXIS: 29 DEGREES
P-R INTERVAL: 210 MS
Q-T INTERVAL: 384 MS
QRS DURATION: 90 MS
QTC CALCULATION (BEZET): 405 MS
R AXIS: -4 DEGREES
T AXIS: 18 DEGREES
VENTRICULAR RATE: 67 BPM

## 2023-08-21 NOTE — TELEPHONE ENCOUNTER
Patient's wife Krystin Brock is calling patient was seen in the ER for Stroke Evaluation patient had testing  MRI  CT  Blood Work  EKG    He was told to follow up with a Neurologist  Krystin Brock is calling to schedule and appointment with Dr Maye Stone first to discuss the results    There are no openings until October, can patient be added sooner?     Please call Krystin Brock [phone]

## 2023-08-21 NOTE — TELEPHONE ENCOUNTER
As FYI to DR. PENA - patient was seen in ER \"word finding difficulty\"to F/U with DR Yevgeniy Amaro

## 2023-08-21 NOTE — TELEPHONE ENCOUNTER
Noted.  I reviewed lab tests and x-rays that were done in the emergency room last Friday. I then called patient's wife. I have asked patient's wife to bring the patient into see me on Wednesday, August 23, at 4:30 PM.  I told wife that we would move him up earlier in the day if something opens up.   I will forward this message to the  to add the patient to my schedule on Wednesday, April 23, at 4:30 PM.  Thank you!!

## 2023-08-23 ENCOUNTER — TELEPHONE (OUTPATIENT)
Dept: INTERNAL MEDICINE CLINIC | Facility: CLINIC | Age: 68
End: 2023-08-23

## 2023-08-23 ENCOUNTER — OFFICE VISIT (OUTPATIENT)
Dept: INTERNAL MEDICINE CLINIC | Facility: CLINIC | Age: 68
End: 2023-08-23

## 2023-08-23 VITALS
BODY MASS INDEX: 47.74 KG/M2 | HEIGHT: 68 IN | TEMPERATURE: 99 F | WEIGHT: 315 LBS | SYSTOLIC BLOOD PRESSURE: 130 MMHG | HEART RATE: 68 BPM | DIASTOLIC BLOOD PRESSURE: 80 MMHG | OXYGEN SATURATION: 96 %

## 2023-08-23 DIAGNOSIS — Z12.5 PROSTATE CANCER SCREENING: ICD-10-CM

## 2023-08-23 DIAGNOSIS — Z86.69 HISTORY OF SLEEP APNEA: ICD-10-CM

## 2023-08-23 DIAGNOSIS — Z98.84 HISTORY OF GASTRIC BYPASS: ICD-10-CM

## 2023-08-23 DIAGNOSIS — Z81.8 FAMILY HISTORY OF DEMENTIA: ICD-10-CM

## 2023-08-23 DIAGNOSIS — R53.83 FATIGUE, UNSPECIFIED TYPE: ICD-10-CM

## 2023-08-23 DIAGNOSIS — Z00.00 ANNUAL PHYSICAL EXAM: Primary | ICD-10-CM

## 2023-08-23 DIAGNOSIS — E78.9 BORDERLINE HIGH CHOLESTEROL: ICD-10-CM

## 2023-08-23 DIAGNOSIS — R41.3 MEMORY PROBLEM: ICD-10-CM

## 2023-08-23 DIAGNOSIS — D64.9 ANEMIA, UNSPECIFIED TYPE: ICD-10-CM

## 2023-08-23 LAB
OCCULT BLOOD, STOOL 1: NEGATIVE
PERFORMANCE MONITORS CORRECT (YES/NO): YES YES/NO

## 2023-08-23 PROCEDURE — 99215 OFFICE O/P EST HI 40 MIN: CPT | Performed by: INTERNAL MEDICINE

## 2023-08-23 PROCEDURE — 82272 OCCULT BLD FECES 1-3 TESTS: CPT | Performed by: INTERNAL MEDICINE

## 2023-08-23 NOTE — PATIENT INSTRUCTIONS
Patient is to continue his current diet, medications and activity. I will see the patient back in 1 month with lab test which will include a CBC, CMP, lipid panel, TSH, PSA, and urinalysis. I will also obtain iron, iron binding capacity, ferritin level, vitamin Z02 level and folic acid level. I will refer the patient to either Dr Alicia Bustamante, Dr. Junius Gosselin or Dr. Joslyn Sims for neurology evaluation to evaluate patient's memory issues and possible dementia.

## 2023-08-23 NOTE — TELEPHONE ENCOUNTER
Yes, that will be fine!!  Also, please note that this visit will be his Annual Physical exam.  I will forward this message to the .   Thank you!!

## 2023-08-31 ENCOUNTER — LAB ENCOUNTER (OUTPATIENT)
Dept: LAB | Facility: HOSPITAL | Age: 68
End: 2023-08-31
Attending: Other
Payer: MEDICARE

## 2023-08-31 DIAGNOSIS — G31.84 MCI (MILD COGNITIVE IMPAIRMENT) WITH MEMORY LOSS: ICD-10-CM

## 2023-08-31 DIAGNOSIS — E78.9 BORDERLINE HIGH CHOLESTEROL: ICD-10-CM

## 2023-08-31 DIAGNOSIS — Z98.84 HISTORY OF GASTRIC BYPASS: ICD-10-CM

## 2023-08-31 DIAGNOSIS — Z00.00 ANNUAL PHYSICAL EXAM: ICD-10-CM

## 2023-08-31 DIAGNOSIS — D64.9 ANEMIA, UNSPECIFIED TYPE: ICD-10-CM

## 2023-08-31 DIAGNOSIS — Z12.5 PROSTATE CANCER SCREENING: ICD-10-CM

## 2023-08-31 DIAGNOSIS — R41.3 MEMORY PROBLEM: ICD-10-CM

## 2023-08-31 DIAGNOSIS — R53.83 FATIGUE, UNSPECIFIED TYPE: ICD-10-CM

## 2023-08-31 LAB
ALBUMIN SERPL-MCNC: 3.6 G/DL (ref 3.4–5)
ALBUMIN/GLOB SERPL: 1 {RATIO} (ref 1–2)
ALP LIVER SERPL-CCNC: 90 U/L
ALT SERPL-CCNC: 23 U/L
ANION GAP SERPL CALC-SCNC: 4 MMOL/L (ref 0–18)
AST SERPL-CCNC: 23 U/L (ref 15–37)
BASOPHILS # BLD AUTO: 0.06 X10(3) UL (ref 0–0.2)
BASOPHILS NFR BLD AUTO: 1 %
BILIRUB SERPL-MCNC: 0.2 MG/DL (ref 0.1–2)
BILIRUB UR QL: NEGATIVE
BUN BLD-MCNC: 12 MG/DL (ref 7–18)
BUN/CREAT SERPL: 13.3 (ref 10–20)
CALCIUM BLD-MCNC: 8.8 MG/DL (ref 8.5–10.1)
CHLORIDE SERPL-SCNC: 109 MMOL/L (ref 98–112)
CHOLEST SERPL-MCNC: 116 MG/DL (ref ?–200)
CLARITY UR: CLEAR
CO2 SERPL-SCNC: 27 MMOL/L (ref 21–32)
COLOR UR: YELLOW
COMPLEXED PSA SERPL-MCNC: 1.12 NG/ML (ref ?–4)
CREAT BLD-MCNC: 0.9 MG/DL
DEPRECATED HBV CORE AB SER IA-ACNC: 9.2 NG/ML
DEPRECATED RDW RBC AUTO: 42.7 FL (ref 35.1–46.3)
EGFRCR SERPLBLD CKD-EPI 2021: 93 ML/MIN/1.73M2 (ref 60–?)
EOSINOPHIL # BLD AUTO: 0.16 X10(3) UL (ref 0–0.7)
EOSINOPHIL NFR BLD AUTO: 2.7 %
ERYTHROCYTE [DISTWIDTH] IN BLOOD BY AUTOMATED COUNT: 15 % (ref 11–15)
FASTING PATIENT LIPID ANSWER: YES
FASTING STATUS PATIENT QL REPORTED: YES
FOLATE SERPL-MCNC: >20 NG/ML (ref 8.7–?)
GLOBULIN PLAS-MCNC: 3.6 G/DL (ref 2.8–4.4)
GLUCOSE BLD-MCNC: 88 MG/DL (ref 70–99)
GLUCOSE UR-MCNC: NEGATIVE MG/DL
HCT VFR BLD AUTO: 37.2 %
HDLC SERPL-MCNC: 44 MG/DL (ref 40–59)
HGB BLD-MCNC: 11.2 G/DL
HGB UR QL STRIP.AUTO: NEGATIVE
IMM GRANULOCYTES # BLD AUTO: 0.02 X10(3) UL (ref 0–1)
IMM GRANULOCYTES NFR BLD: 0.3 %
IRON SATN MFR SERPL: 9 %
IRON SERPL-MCNC: 45 UG/DL
KETONES UR-MCNC: NEGATIVE MG/DL
LDLC SERPL CALC-MCNC: 54 MG/DL (ref ?–100)
LEUKOCYTE ESTERASE UR QL STRIP.AUTO: NEGATIVE
LYMPHOCYTES # BLD AUTO: 2.6 X10(3) UL (ref 1–4)
LYMPHOCYTES NFR BLD AUTO: 43.1 %
MCH RBC QN AUTO: 23.8 PG (ref 26–34)
MCHC RBC AUTO-ENTMCNC: 30.1 G/DL (ref 31–37)
MCV RBC AUTO: 79 FL
MONOCYTES # BLD AUTO: 0.61 X10(3) UL (ref 0.1–1)
MONOCYTES NFR BLD AUTO: 10.1 %
NEUTROPHILS # BLD AUTO: 2.58 X10 (3) UL (ref 1.5–7.7)
NEUTROPHILS # BLD AUTO: 2.58 X10(3) UL (ref 1.5–7.7)
NEUTROPHILS NFR BLD AUTO: 42.8 %
NITRITE UR QL STRIP.AUTO: NEGATIVE
NONHDLC SERPL-MCNC: 72 MG/DL (ref ?–130)
OSMOLALITY SERPL CALC.SUM OF ELEC: 289 MOSM/KG (ref 275–295)
PH UR: 5 [PH] (ref 5–8)
PLATELET # BLD AUTO: 262 10(3)UL (ref 150–450)
POTASSIUM SERPL-SCNC: 4.1 MMOL/L (ref 3.5–5.1)
PROT SERPL-MCNC: 7.2 G/DL (ref 6.4–8.2)
PROT UR-MCNC: NEGATIVE MG/DL
RBC # BLD AUTO: 4.71 X10(6)UL
SODIUM SERPL-SCNC: 140 MMOL/L (ref 136–145)
SP GR UR STRIP: >=1.03 (ref 1–1.03)
TIBC SERPL-MCNC: 484 UG/DL (ref 240–450)
TRANSFERRIN SERPL-MCNC: 325 MG/DL (ref 200–360)
TRIGL SERPL-MCNC: 94 MG/DL (ref 30–149)
TSI SER-ACNC: 3.51 MIU/ML (ref 0.36–3.74)
UROBILINOGEN UR STRIP-ACNC: 0.2
VIT B12 SERPL-MCNC: 161 PG/ML (ref 193–986)
VLDLC SERPL CALC-MCNC: 14 MG/DL (ref 0–30)
WBC # BLD AUTO: 6 X10(3) UL (ref 4–11)

## 2023-08-31 PROCEDURE — 84443 ASSAY THYROID STIM HORMONE: CPT

## 2023-08-31 PROCEDURE — 82746 ASSAY OF FOLIC ACID SERUM: CPT

## 2023-08-31 PROCEDURE — 83540 ASSAY OF IRON: CPT

## 2023-08-31 PROCEDURE — 82607 VITAMIN B-12: CPT

## 2023-08-31 PROCEDURE — 85025 COMPLETE CBC W/AUTO DIFF WBC: CPT

## 2023-08-31 PROCEDURE — 86780 TREPONEMA PALLIDUM: CPT

## 2023-08-31 PROCEDURE — 36415 COLL VENOUS BLD VENIPUNCTURE: CPT

## 2023-08-31 PROCEDURE — 80061 LIPID PANEL: CPT

## 2023-08-31 PROCEDURE — 84466 ASSAY OF TRANSFERRIN: CPT

## 2023-08-31 PROCEDURE — 80053 COMPREHEN METABOLIC PANEL: CPT

## 2023-08-31 PROCEDURE — 81003 URINALYSIS AUTO W/O SCOPE: CPT | Performed by: INTERNAL MEDICINE

## 2023-08-31 PROCEDURE — 82728 ASSAY OF FERRITIN: CPT

## 2023-09-01 LAB — T PALLIDUM AB SER QL: NEGATIVE

## 2023-09-12 ENCOUNTER — TELEPHONE (OUTPATIENT)
Dept: INTERNAL MEDICINE CLINIC | Facility: CLINIC | Age: 68
End: 2023-09-12

## 2023-09-21 ENCOUNTER — HOSPITAL ENCOUNTER (OUTPATIENT)
Age: 68
Discharge: HOME | End: 2023-09-21
Payer: MEDICARE

## 2023-09-21 VITALS
TEMPERATURE: 97 F | OXYGEN SATURATION: 99 % | RESPIRATION RATE: 16 BRPM | SYSTOLIC BLOOD PRESSURE: 144 MMHG | HEART RATE: 80 BPM | DIASTOLIC BLOOD PRESSURE: 78 MMHG

## 2023-09-21 DIAGNOSIS — H60.501 ACUTE NON-INFECTIVE OTITIS EXTERNA OF RIGHT EAR, UNSPECIFIED TYPE: Primary | ICD-10-CM

## 2023-09-21 PROCEDURE — 99213 OFFICE O/P EST LOW 20 MIN: CPT | Performed by: NURSE PRACTITIONER

## 2023-09-21 RX ORDER — CIPROFLOXACIN AND DEXAMETHASONE 3; 1 MG/ML; MG/ML
4 SUSPENSION/ DROPS AURICULAR (OTIC) 2 TIMES DAILY
Qty: 7.5 ML | Refills: 0 | Status: SHIPPED | OUTPATIENT
Start: 2023-09-21 | End: 2023-09-28

## 2023-09-21 NOTE — DISCHARGE INSTRUCTIONS
Start the antibiotic eardrops as prescribed follow-up with the ears nose and throat specialist if symptoms persist or worsen. Follow-up with your primary care provider. If you develop worsening ear pain severe sore throat neck swelling severe headache drainage from the ear outer ear swelling redness or warmth fever nausea or vomiting dizziness blurry vision or vision changes or any new or worsening symptoms go to the nearest emergency department.

## 2023-09-22 RX ORDER — CIPROFLOXACIN AND DEXAMETHASONE 3; 1 MG/ML; MG/ML
4 SUSPENSION/ DROPS AURICULAR (OTIC) 2 TIMES DAILY
Qty: 7.5 ML | Refills: 0 | Status: SHIPPED | OUTPATIENT
Start: 2023-09-22 | End: 2023-09-29

## 2023-09-27 ENCOUNTER — OFFICE VISIT (OUTPATIENT)
Dept: INTERNAL MEDICINE CLINIC | Facility: CLINIC | Age: 68
End: 2023-09-27

## 2023-09-27 VITALS
OXYGEN SATURATION: 97 % | HEIGHT: 68 IN | SYSTOLIC BLOOD PRESSURE: 128 MMHG | BODY MASS INDEX: 47.74 KG/M2 | WEIGHT: 315 LBS | HEART RATE: 68 BPM | DIASTOLIC BLOOD PRESSURE: 80 MMHG

## 2023-09-27 DIAGNOSIS — Z00.00 ANNUAL PHYSICAL EXAM: Primary | ICD-10-CM

## 2023-09-27 DIAGNOSIS — Z23 IMMUNIZATION DUE: ICD-10-CM

## 2023-09-27 DIAGNOSIS — R41.3 MEMORY PROBLEM: ICD-10-CM

## 2023-09-27 DIAGNOSIS — D51.9 ANEMIA DUE TO VITAMIN B12 DEFICIENCY, UNSPECIFIED B12 DEFICIENCY TYPE: ICD-10-CM

## 2023-09-27 DIAGNOSIS — D50.9 IRON DEFICIENCY ANEMIA, UNSPECIFIED IRON DEFICIENCY ANEMIA TYPE: ICD-10-CM

## 2023-09-27 DIAGNOSIS — Z86.69 HISTORY OF SLEEP APNEA: ICD-10-CM

## 2023-09-27 DIAGNOSIS — Z98.84 HISTORY OF GASTRIC BYPASS: ICD-10-CM

## 2023-09-27 DIAGNOSIS — R53.83 FATIGUE, UNSPECIFIED TYPE: ICD-10-CM

## 2023-09-27 DIAGNOSIS — Z81.8 FAMILY HISTORY OF DEMENTIA: ICD-10-CM

## 2023-09-27 DIAGNOSIS — J01.91 ACUTE RECURRENT SINUSITIS, UNSPECIFIED LOCATION: ICD-10-CM

## 2023-09-27 PROCEDURE — 96372 THER/PROPH/DIAG INJ SC/IM: CPT | Performed by: INTERNAL MEDICINE

## 2023-09-27 PROCEDURE — G0009 ADMIN PNEUMOCOCCAL VACCINE: HCPCS | Performed by: INTERNAL MEDICINE

## 2023-09-27 PROCEDURE — 99214 OFFICE O/P EST MOD 30 MIN: CPT | Performed by: INTERNAL MEDICINE

## 2023-09-27 PROCEDURE — 90677 PCV20 VACCINE IM: CPT | Performed by: INTERNAL MEDICINE

## 2023-09-27 PROCEDURE — G0438 PPPS, INITIAL VISIT: HCPCS | Performed by: INTERNAL MEDICINE

## 2023-09-27 RX ORDER — CYANOCOBALAMIN 1000 UG/ML
1000 INJECTION, SOLUTION INTRAMUSCULAR; SUBCUTANEOUS
Status: SHIPPED | OUTPATIENT
Start: 2023-09-27 | End: 2024-09-21

## 2023-09-27 RX ORDER — AMOXICILLIN AND CLAVULANATE POTASSIUM 875; 125 MG/1; MG/1
1 TABLET, FILM COATED ORAL 2 TIMES DAILY
Qty: 20 TABLET | Refills: 1 | Status: SHIPPED | OUTPATIENT
Start: 2023-09-27 | End: 2023-10-17

## 2023-09-27 RX ADMIN — CYANOCOBALAMIN 1000 MCG: 1000 INJECTION, SOLUTION INTRAMUSCULAR; SUBCUTANEOUS at 16:42:00

## 2023-09-27 NOTE — PATIENT INSTRUCTIONS
Patient is to continue his current diet, medication and activity. Patient is to continue to take his iron supplementation as he is doing at this time. I will switch the patient to vitamin B12 injections 1000 mcg IM monthly. I will obtain a CBC sometime in the next few days. Based on the CBC will determine whether or not the patient should receive some iron infusions. I will give the patient a prescription for Augmentin 875 mg orally twice daily for 10 days with 1 refill to see if this will help his sinus infections. If patient continues to have sinus issues I will refer the patient to ENT for evaluation. Patient is in the process of arranging a sleep study which was ordered by Dr. Zelalem Andrade. I will tentatively plan to see the patient back in about 3 months. I will see the patient back sooner as necessary. I will give the patient his Prevnar 20 vaccine today. I have encouraged the patient to get his flu vaccine and COVID-vaccine at the pharmacy in the near future. After he has the flu vaccine and COVID-vaccine patient should consider getting the RSV vaccine.

## 2023-11-02 ENCOUNTER — NURSE ONLY (OUTPATIENT)
Dept: INTERNAL MEDICINE CLINIC | Facility: CLINIC | Age: 68
End: 2023-11-02

## 2023-11-02 ENCOUNTER — LAB ENCOUNTER (OUTPATIENT)
Dept: LAB | Facility: HOSPITAL | Age: 68
End: 2023-11-02
Attending: INTERNAL MEDICINE
Payer: MEDICARE

## 2023-11-02 DIAGNOSIS — D51.9 ANEMIA DUE TO VITAMIN B12 DEFICIENCY, UNSPECIFIED B12 DEFICIENCY TYPE: Primary | ICD-10-CM

## 2023-11-02 DIAGNOSIS — D51.9 ANEMIA DUE TO VITAMIN B12 DEFICIENCY, UNSPECIFIED B12 DEFICIENCY TYPE: ICD-10-CM

## 2023-11-02 DIAGNOSIS — D50.9 IRON DEFICIENCY ANEMIA, UNSPECIFIED IRON DEFICIENCY ANEMIA TYPE: ICD-10-CM

## 2023-11-02 LAB
BASOPHILS # BLD AUTO: 0.05 X10(3) UL (ref 0–0.2)
BASOPHILS NFR BLD AUTO: 0.7 %
DEPRECATED RDW RBC AUTO: 46.4 FL (ref 35.1–46.3)
EOSINOPHIL # BLD AUTO: 0.15 X10(3) UL (ref 0–0.7)
EOSINOPHIL NFR BLD AUTO: 2.2 %
ERYTHROCYTE [DISTWIDTH] IN BLOOD BY AUTOMATED COUNT: 15.5 % (ref 11–15)
HCT VFR BLD AUTO: 39.5 %
HGB BLD-MCNC: 12.3 G/DL
IMM GRANULOCYTES # BLD AUTO: 0.03 X10(3) UL (ref 0–1)
IMM GRANULOCYTES NFR BLD: 0.4 %
LYMPHOCYTES # BLD AUTO: 2.38 X10(3) UL (ref 1–4)
LYMPHOCYTES NFR BLD AUTO: 35.2 %
MCH RBC QN AUTO: 25.4 PG (ref 26–34)
MCHC RBC AUTO-ENTMCNC: 31.1 G/DL (ref 31–37)
MCV RBC AUTO: 81.4 FL
MONOCYTES # BLD AUTO: 0.66 X10(3) UL (ref 0.1–1)
MONOCYTES NFR BLD AUTO: 9.7 %
NEUTROPHILS # BLD AUTO: 3.5 X10 (3) UL (ref 1.5–7.7)
NEUTROPHILS # BLD AUTO: 3.5 X10(3) UL (ref 1.5–7.7)
NEUTROPHILS NFR BLD AUTO: 51.8 %
PLATELET # BLD AUTO: 220 10(3)UL (ref 150–450)
RBC # BLD AUTO: 4.85 X10(6)UL
WBC # BLD AUTO: 6.8 X10(3) UL (ref 4–11)

## 2023-11-02 PROCEDURE — 85025 COMPLETE CBC W/AUTO DIFF WBC: CPT

## 2023-11-02 PROCEDURE — 36415 COLL VENOUS BLD VENIPUNCTURE: CPT

## 2023-11-02 RX ADMIN — CYANOCOBALAMIN 1000 MCG: 1000 INJECTION, SOLUTION INTRAMUSCULAR; SUBCUTANEOUS at 11:12:00

## 2023-11-02 NOTE — PROGRESS NOTES
Patient received B12 injection in left deltoid at a Nurse visit today. Tolerated well and left feeling well.

## 2023-11-05 ENCOUNTER — TELEPHONE (OUTPATIENT)
Dept: INTERNAL MEDICINE CLINIC | Facility: CLINIC | Age: 68
End: 2023-11-05

## 2023-11-05 DIAGNOSIS — D50.9 IRON DEFICIENCY ANEMIA, UNSPECIFIED IRON DEFICIENCY ANEMIA TYPE: Primary | ICD-10-CM

## 2023-11-05 NOTE — TELEPHONE ENCOUNTER
Please call pt/wife and notify them that pt's anemia is improving with iron supplementation and Vit B12 Supplementation. Please tell them to continue the both the iron supplementation and Vit B12 supplementation. Also, I will place an order for another CBC to be done prior to pt's next office visit with me on December 4. I will forward this message to nursing.   Thank you!!

## 2023-11-06 NOTE — TELEPHONE ENCOUNTER
.  I have approved the order for the patient to have a CBC. I will forward this to nursing to notify the patient that the order is in the system.   Thank you!!

## 2023-11-06 NOTE — TELEPHONE ENCOUNTER
Spoke with patient to relay MD message below; Patient verbalized understanding. Pt did not have any questions or concerns at this time. Pended CBC for Dr. PENA to sign off on; need associated dx please.

## 2024-01-08 ENCOUNTER — OFFICE VISIT (OUTPATIENT)
Dept: INTERNAL MEDICINE CLINIC | Facility: CLINIC | Age: 69
End: 2024-01-08

## 2024-01-08 VITALS
BODY MASS INDEX: 47.74 KG/M2 | OXYGEN SATURATION: 100 % | HEIGHT: 68 IN | TEMPERATURE: 98 F | DIASTOLIC BLOOD PRESSURE: 90 MMHG | SYSTOLIC BLOOD PRESSURE: 136 MMHG | HEART RATE: 64 BPM | WEIGHT: 315 LBS

## 2024-01-08 DIAGNOSIS — Z98.84 HISTORY OF GASTRIC BYPASS: ICD-10-CM

## 2024-01-08 DIAGNOSIS — R05.1 ACUTE COUGH: ICD-10-CM

## 2024-01-08 DIAGNOSIS — R09.81 NASAL CONGESTION: ICD-10-CM

## 2024-01-08 DIAGNOSIS — D51.9 ANEMIA DUE TO VITAMIN B12 DEFICIENCY, UNSPECIFIED B12 DEFICIENCY TYPE: ICD-10-CM

## 2024-01-08 DIAGNOSIS — Z81.8 FAMILY HISTORY OF DEMENTIA: ICD-10-CM

## 2024-01-08 DIAGNOSIS — R41.3 MEMORY PROBLEM: Primary | ICD-10-CM

## 2024-01-08 DIAGNOSIS — Z86.69 HISTORY OF SLEEP APNEA: ICD-10-CM

## 2024-01-08 DIAGNOSIS — D50.9 IRON DEFICIENCY ANEMIA, UNSPECIFIED IRON DEFICIENCY ANEMIA TYPE: ICD-10-CM

## 2024-01-08 DIAGNOSIS — R53.83 FATIGUE, UNSPECIFIED TYPE: ICD-10-CM

## 2024-01-08 PROCEDURE — 87637 SARSCOV2&INF A&B&RSV AMP PRB: CPT | Performed by: INTERNAL MEDICINE

## 2024-01-08 RX ORDER — MULTIVITAMIN
1 TABLET ORAL DAILY
COMMUNITY

## 2024-01-08 RX ADMIN — CYANOCOBALAMIN 1000 MCG: 1000 INJECTION, SOLUTION INTRAMUSCULAR; SUBCUTANEOUS at 11:56:00

## 2024-01-08 NOTE — PROGRESS NOTES
Art Katz is a 69 year old male.  Chief Complaint   Patient presents with    Checkup     3 month, grandson lives with patient diagnosed with Influenza A on 1/2/24. Runny nose and cough since Christmas. No recent Covid testing    Memory Loss    Anemia    Fatigue    Obstructive Sleep Apnea (RENETTA)     HPI:     Chief Complaint   Patient presents with    Checkup     3 month, grandson lives with patient diagnosed with Influenza A on 1/2/24. Runny nose and cough since Christmas. No recent Covid testing    Memory Loss    Anemia    Fatigue    Obstructive Sleep Apnea (RENETTA)     Pt has been coughing for the last 2 weeks.  Pt had a fever to 101 for 1 day.  Pt still has some head congestion  with post nasal drip.  Pt's grandson has been diagnosed with Influenza A on January 2.  Pt feels fatigued.  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  Pt is still on iron supplementation.  Patient no longer has a fever.  Patient has been wearing a mask.  Current Outpatient Medications   Medication Sig Dispense Refill    Multiple Vitamin (ONE-DAILY MULTI VITAMINS) Oral Tab Take 1 tablet by mouth daily.      Omega-3 Fatty Acids (FISH OIL OR) Take 1 capsule by mouth daily.      vitamin E 1000 UNITS Oral Cap Take 1 capsule (1,000 Units total) by mouth daily.        Past Medical History:   Diagnosis Date    Calculus of kidney     COVID     Kidney stone     Obesity       Social History:  Social History     Socioeconomic History    Marital status:    Tobacco Use    Smokeless tobacco: Never   Vaping Use    Vaping Use: Never used   Substance and Sexual Activity    Alcohol use: Not Currently    Drug use: Not Currently        REVIEW OF SYSTEMS:   GENERAL HEALTH: feels well otherwise  RESPIRATORY:No cough or SOB  CARDIOVASCULAR: No chest pain  GI: No abdominal pain, nausea, vomiting, diarrhea, or constipation  :No Urinary complaints  EXT:No complaints of pain or swelling in patient's legs    EXAM:   /90 (BP Location: Right  arm, Patient Position: Sitting, Cuff Size: large)   Pulse 64   Temp 98.3 °F (36.8 °C)   Ht 5' 8\" (1.727 m)   Wt (!) 322 lb 12.8 oz (146.4 kg)   SpO2 100%   BMI 49.08 kg/m²   GENERAL: well developed, well nourished in no acute distress  HEENT: normal oropharynx. Ears are normal. Eyes are normal  NECK: supple,no lymphadenopathy or masses, no bruits  CHEST: Well-developed male.  LUNGS: clear to auscultation  CARDIO: RRR, normal S1S2, without murmur   GI:Protuberant, BS are present, no organomegaly or palpable masses  EXTREMITIES: no edema  NEURO: alert and oriented  ASSESSMENT AND PLAN:   1. Memory problem  Patient is seen today with his wife.  Patient continues to have memory problems.  Patient has seen Dr. Byrd for neurology evaluation who feels patient may have a mild cognitive impairment/Alzheimer's disease.  He is ordered neuropsychological testing which was done but the result is not yet been obtained.  Patient will have a CBC done in the near future.  I will await that result.  Patient was given a flu vaccine last August.  I will tentatively plan to see the patient back in 3 months.  I will await the results of the patient's CBC to determine what other blood test patient may need to get in the future.    2. Iron deficiency anemia, unspecified iron deficiency anemia type  Patient appears to be doing better.  Patient's most recent CBC which was done on November 2, 2023 shows the hemoglobin to be 12.3, hematocrit 39.5 and WBC of 6800 with a platelet count of 220,000.  The CBC is improved from his prior CBC.  Patient is to continue his iron supplementation.  I will await the patient's CBC which will be done either later today or within the next week or 2.    3. Anemia due to vitamin B12 deficiency, unspecified B12 deficiency type  Stable.  CPM.  As above.  Patient does get monthly vitamin B12 injections.    4. History of gastric bypass  Stable.  CPM.    5. Fatigue, unspecified type  Stable.  CPM.    6.  History of sleep apnea  Stable.  CPM.    7. Family history of dementia  Stable.  CPM.      The patient indicates understanding of these issues and agrees to the plan.  The patient is asked to return in 3 months as noted above..    Shawn Ramirez MD  1/8/2024  12:40 PM

## 2024-01-08 NOTE — PATIENT INSTRUCTIONS
Patient is to continue his current diet, medication and activity.  Patient is to continue his iron supplementation.  Patient will have a CBC drawn later today or in the near future.  The order is already in the system.  I have taken a nasal swab today to check for COVID, flu and RSV.  I will await that result.  I will tentatively plan to see the patient back in about 3 months.  I will await the results of the patient's recent neuropsychological testing.

## 2024-01-09 LAB
FLUAV + FLUBV RNA SPEC NAA+PROBE: NOT DETECTED
FLUAV + FLUBV RNA SPEC NAA+PROBE: NOT DETECTED
RSV RNA SPEC NAA+PROBE: NOT DETECTED
SARS-COV-2 RNA RESP QL NAA+PROBE: NOT DETECTED

## 2024-01-12 ENCOUNTER — TELEPHONE (OUTPATIENT)
Dept: INTERNAL MEDICINE CLINIC | Facility: CLINIC | Age: 69
End: 2024-01-12

## 2024-01-13 NOTE — TELEPHONE ENCOUNTER
Telephone call to pt and message left that hid recent nasal swab was negative for Covid, Influenzae and RSV.  Pt/wife to call back if pt has more problems.

## 2024-02-07 ENCOUNTER — NURSE ONLY (OUTPATIENT)
Dept: INTERNAL MEDICINE CLINIC | Facility: CLINIC | Age: 69
End: 2024-02-07

## 2024-02-07 DIAGNOSIS — R53.83 FATIGUE, UNSPECIFIED TYPE: ICD-10-CM

## 2024-02-07 DIAGNOSIS — D64.9 ANEMIA, UNSPECIFIED TYPE: Primary | ICD-10-CM

## 2024-02-07 RX ADMIN — CYANOCOBALAMIN 1000 MCG: 1000 INJECTION, SOLUTION INTRAMUSCULAR; SUBCUTANEOUS at 12:36:00

## 2024-02-07 NOTE — PROGRESS NOTES
Patient is here today for Vitamin B12 Injection. Patient has verified purpose of visit, their name and . Injection was drawn up and administered by MARIBEL MACHADO. Patient tolerated IM injection (deltoid muscle) well. Pt is aware they are to return in 1 month for next injection. See MAR Additional Details ie. NDC, LOT & EXP of single dose vial.

## 2024-02-20 ENCOUNTER — LAB ENCOUNTER (OUTPATIENT)
Dept: LAB | Facility: HOSPITAL | Age: 69
End: 2024-02-20
Attending: INTERNAL MEDICINE
Payer: MEDICARE

## 2024-02-20 DIAGNOSIS — D50.9 IRON DEFICIENCY ANEMIA, UNSPECIFIED IRON DEFICIENCY ANEMIA TYPE: ICD-10-CM

## 2024-02-20 LAB
BASOPHILS # BLD AUTO: 0.04 X10(3) UL (ref 0–0.2)
BASOPHILS NFR BLD AUTO: 0.7 %
DEPRECATED RDW RBC AUTO: 41.6 FL (ref 35.1–46.3)
EOSINOPHIL # BLD AUTO: 0.14 X10(3) UL (ref 0–0.7)
EOSINOPHIL NFR BLD AUTO: 2.4 %
ERYTHROCYTE [DISTWIDTH] IN BLOOD BY AUTOMATED COUNT: 13.8 % (ref 11–15)
HCT VFR BLD AUTO: 41.6 %
HGB BLD-MCNC: 13.6 G/DL
IMM GRANULOCYTES # BLD AUTO: 0.02 X10(3) UL (ref 0–1)
IMM GRANULOCYTES NFR BLD: 0.3 %
LYMPHOCYTES # BLD AUTO: 2.43 X10(3) UL (ref 1–4)
LYMPHOCYTES NFR BLD AUTO: 42.5 %
MCH RBC QN AUTO: 26.9 PG (ref 26–34)
MCHC RBC AUTO-ENTMCNC: 32.7 G/DL (ref 31–37)
MCV RBC AUTO: 82.4 FL
MONOCYTES # BLD AUTO: 0.6 X10(3) UL (ref 0.1–1)
MONOCYTES NFR BLD AUTO: 10.5 %
NEUTROPHILS # BLD AUTO: 2.49 X10 (3) UL (ref 1.5–7.7)
NEUTROPHILS # BLD AUTO: 2.49 X10(3) UL (ref 1.5–7.7)
NEUTROPHILS NFR BLD AUTO: 43.6 %
PLATELET # BLD AUTO: 216 10(3)UL (ref 150–450)
RBC # BLD AUTO: 5.05 X10(6)UL
WBC # BLD AUTO: 5.7 X10(3) UL (ref 4–11)

## 2024-02-20 PROCEDURE — 85025 COMPLETE CBC W/AUTO DIFF WBC: CPT

## 2024-02-20 PROCEDURE — 36415 COLL VENOUS BLD VENIPUNCTURE: CPT

## 2024-03-07 ENCOUNTER — NURSE ONLY (OUTPATIENT)
Dept: INTERNAL MEDICINE CLINIC | Facility: CLINIC | Age: 69
End: 2024-03-07

## 2024-03-07 DIAGNOSIS — D50.9 IRON DEFICIENCY ANEMIA, UNSPECIFIED IRON DEFICIENCY ANEMIA TYPE: Primary | ICD-10-CM

## 2024-03-07 PROCEDURE — 96372 THER/PROPH/DIAG INJ SC/IM: CPT | Performed by: INTERNAL MEDICINE

## 2024-03-07 RX ADMIN — CYANOCOBALAMIN 1000 MCG: 1000 INJECTION, SOLUTION INTRAMUSCULAR; SUBCUTANEOUS at 11:22:00

## 2024-03-07 NOTE — PROGRESS NOTES
Patient is here today for Vitamin B12 Injection. Patient has verified purpose of visit, their name and . Injection was drawn up and administered by DANDRERN. Patient tolerated IM injection (right deltoid muscle) well. Pt is aware they are to return in 1 month for next injection. See MAR Additional Details ie. NDC, LOT & EXP of single dose vial.

## 2024-09-06 ENCOUNTER — TELEPHONE (OUTPATIENT)
Dept: INTERNAL MEDICINE CLINIC | Facility: CLINIC | Age: 69
End: 2024-09-06

## 2024-09-06 ENCOUNTER — NURSE TRIAGE (OUTPATIENT)
Dept: INTERNAL MEDICINE CLINIC | Facility: CLINIC | Age: 69
End: 2024-09-06

## 2024-09-06 ENCOUNTER — OFFICE VISIT (OUTPATIENT)
Dept: INTERNAL MEDICINE CLINIC | Facility: CLINIC | Age: 69
End: 2024-09-06

## 2024-09-06 VITALS
BODY MASS INDEX: 47.74 KG/M2 | OXYGEN SATURATION: 97 % | SYSTOLIC BLOOD PRESSURE: 132 MMHG | HEIGHT: 68 IN | WEIGHT: 315 LBS | TEMPERATURE: 98 F | HEART RATE: 80 BPM | DIASTOLIC BLOOD PRESSURE: 80 MMHG | RESPIRATION RATE: 18 BRPM

## 2024-09-06 DIAGNOSIS — Z76.89 ENCOUNTER TO ESTABLISH CARE: Primary | ICD-10-CM

## 2024-09-06 DIAGNOSIS — R42 DIZZY SPELLS: ICD-10-CM

## 2024-09-06 DIAGNOSIS — H93.13 TINNITUS OF BOTH EARS: ICD-10-CM

## 2024-09-06 DIAGNOSIS — J32.9 RECURRENT SINUS INFECTIONS: ICD-10-CM

## 2024-09-06 DIAGNOSIS — G47.33 OSA (OBSTRUCTIVE SLEEP APNEA): ICD-10-CM

## 2024-09-06 DIAGNOSIS — Z53.20 COLONOSCOPY REFUSED: ICD-10-CM

## 2024-09-06 DIAGNOSIS — G47.00 INSOMNIA, UNSPECIFIED TYPE: ICD-10-CM

## 2024-09-06 DIAGNOSIS — R41.3 MEMORY PROBLEM: ICD-10-CM

## 2024-09-06 DIAGNOSIS — Z98.84 HISTORY OF GASTRIC BYPASS: ICD-10-CM

## 2024-09-06 DIAGNOSIS — R53.83 OTHER FATIGUE: ICD-10-CM

## 2024-09-06 DIAGNOSIS — D50.9 IRON DEFICIENCY ANEMIA, UNSPECIFIED IRON DEFICIENCY ANEMIA TYPE: ICD-10-CM

## 2024-09-06 DIAGNOSIS — E53.8 B12 DEFICIENCY: ICD-10-CM

## 2024-09-06 DIAGNOSIS — L98.9 SKIN LESIONS: ICD-10-CM

## 2024-09-06 LAB
ALBUMIN SERPL-MCNC: 4.5 G/DL (ref 3.2–4.8)
ALBUMIN/GLOB SERPL: 1.1 {RATIO} (ref 1–2)
ALP LIVER SERPL-CCNC: 86 U/L
ALT SERPL-CCNC: 23 U/L
ANION GAP SERPL CALC-SCNC: 8 MMOL/L (ref 0–18)
AST SERPL-CCNC: 22 U/L (ref ?–34)
BASOPHILS # BLD AUTO: 0.06 X10(3) UL (ref 0–0.2)
BASOPHILS NFR BLD AUTO: 0.8 %
BILIRUB SERPL-MCNC: 0.4 MG/DL (ref 0.2–1.1)
BILIRUB UR QL: NEGATIVE
BUN BLD-MCNC: 17 MG/DL (ref 9–23)
BUN/CREAT SERPL: 18.9 (ref 10–20)
CALCIUM BLD-MCNC: 9.1 MG/DL (ref 8.7–10.4)
CHLORIDE SERPL-SCNC: 106 MMOL/L (ref 98–112)
CLARITY UR: CLEAR
CO2 SERPL-SCNC: 25 MMOL/L (ref 21–32)
COLOR UR: YELLOW
CREAT BLD-MCNC: 0.9 MG/DL
DEPRECATED HBV CORE AB SER IA-ACNC: 71.3 NG/ML
DEPRECATED RDW RBC AUTO: 40 FL (ref 35.1–46.3)
EGFRCR SERPLBLD CKD-EPI 2021: 92 ML/MIN/1.73M2 (ref 60–?)
EOSINOPHIL # BLD AUTO: 0.11 X10(3) UL (ref 0–0.7)
EOSINOPHIL NFR BLD AUTO: 1.5 %
ERYTHROCYTE [DISTWIDTH] IN BLOOD BY AUTOMATED COUNT: 13.1 % (ref 11–15)
FASTING STATUS PATIENT QL REPORTED: YES
GLOBULIN PLAS-MCNC: 4.1 G/DL (ref 2–3.5)
GLUCOSE BLD-MCNC: 93 MG/DL (ref 70–99)
GLUCOSE UR-MCNC: NORMAL MG/DL
HCT VFR BLD AUTO: 41.9 %
HGB BLD-MCNC: 13.6 G/DL
HGB UR QL STRIP.AUTO: NEGATIVE
IMM GRANULOCYTES # BLD AUTO: 0.05 X10(3) UL (ref 0–1)
IMM GRANULOCYTES NFR BLD: 0.7 %
IRON SATN MFR SERPL: 20 %
IRON SERPL-MCNC: 74 UG/DL
KETONES UR-MCNC: NEGATIVE MG/DL
LEUKOCYTE ESTERASE UR QL STRIP.AUTO: NEGATIVE
LYMPHOCYTES # BLD AUTO: 2.39 X10(3) UL (ref 1–4)
LYMPHOCYTES NFR BLD AUTO: 32.8 %
MCH RBC QN AUTO: 27.4 PG (ref 26–34)
MCHC RBC AUTO-ENTMCNC: 32.5 G/DL (ref 31–37)
MCV RBC AUTO: 84.5 FL
MONOCYTES # BLD AUTO: 0.72 X10(3) UL (ref 0.1–1)
MONOCYTES NFR BLD AUTO: 9.9 %
NEUTROPHILS # BLD AUTO: 3.96 X10 (3) UL (ref 1.5–7.7)
NEUTROPHILS # BLD AUTO: 3.96 X10(3) UL (ref 1.5–7.7)
NEUTROPHILS NFR BLD AUTO: 54.3 %
NITRITE UR QL STRIP.AUTO: NEGATIVE
OSMOLALITY SERPL CALC.SUM OF ELEC: 289 MOSM/KG (ref 275–295)
PH UR: 6 [PH] (ref 5–8)
PLATELET # BLD AUTO: 233 10(3)UL (ref 150–450)
POTASSIUM SERPL-SCNC: 4.3 MMOL/L (ref 3.5–5.1)
PROT SERPL-MCNC: 8.6 G/DL (ref 5.7–8.2)
RBC # BLD AUTO: 4.96 X10(6)UL
SODIUM SERPL-SCNC: 139 MMOL/L (ref 136–145)
SP GR UR STRIP: 1.03 (ref 1–1.03)
TIBC SERPL-MCNC: 378 UG/DL (ref 250–425)
TRANSFERRIN SERPL-MCNC: 254 MG/DL (ref 215–365)
TRANSFERRIN SERPL-MCNC: 254 MG/DL (ref 215–365)
TSI SER-ACNC: 2.01 MIU/ML (ref 0.55–4.78)
UROBILINOGEN UR STRIP-ACNC: NORMAL
VIT B12 SERPL-MCNC: 308 PG/ML (ref 211–911)
WBC # BLD AUTO: 7.3 X10(3) UL (ref 4–11)

## 2024-09-06 PROCEDURE — 81003 URINALYSIS AUTO W/O SCOPE: CPT | Performed by: INTERNAL MEDICINE

## 2024-09-06 PROCEDURE — 85025 COMPLETE CBC W/AUTO DIFF WBC: CPT | Performed by: INTERNAL MEDICINE

## 2024-09-06 PROCEDURE — 80053 COMPREHEN METABOLIC PANEL: CPT | Performed by: INTERNAL MEDICINE

## 2024-09-06 PROCEDURE — 84466 ASSAY OF TRANSFERRIN: CPT | Performed by: INTERNAL MEDICINE

## 2024-09-06 PROCEDURE — 82607 VITAMIN B-12: CPT | Performed by: INTERNAL MEDICINE

## 2024-09-06 PROCEDURE — 82728 ASSAY OF FERRITIN: CPT | Performed by: INTERNAL MEDICINE

## 2024-09-06 PROCEDURE — 83540 ASSAY OF IRON: CPT | Performed by: INTERNAL MEDICINE

## 2024-09-06 PROCEDURE — 84443 ASSAY THYROID STIM HORMONE: CPT | Performed by: INTERNAL MEDICINE

## 2024-09-06 RX ORDER — AMOXICILLIN 875 MG
875 TABLET ORAL 2 TIMES DAILY
Qty: 20 TABLET | Refills: 0 | Status: SHIPPED | OUTPATIENT
Start: 2024-09-06 | End: 2024-09-16

## 2024-09-06 NOTE — TELEPHONE ENCOUNTER
.Action Requested: Summary for Provider     []  Critical Lab, Recommendations Needed  [] Need Additional Advice  []   FYI    []   Need Orders  [] Need Medications Sent to Pharmacy  []  Other     SUMMARY:    appointment made for today 9/6/24    The patient if a former patient of Dr. Ballesteros.    Per the daughter and patient, who was just riding a bike, for a couple of weeks has been having a cough.  The last week has been having occasional episodes of dizziness and lightheadedness which are coming more frequent.  He will take a nap and the episodes go away.  His children have noticed that he is a little more confused at times than normal.  During the call the patient also stated that his sinus have been hurting him and ears feel clogged.  The daughter stated that he is restless when he sleeps.  The patient stated he did not remember when he voided last or what color but also stated he does not remember a lot.  Last seen for Memory problem on 1/8/24.    Instructed if condition worsens needs to proceed to the emergency room with understanding.    Reason for call: Dizziness and Sinusitis  Onset: Data Unavailable      General Assessment (questions to ask the caller)  Do you consider this a medical emergency?: No  Can you access 911?: Yes  Do you have any pain?: No  Do you have a fever?: No  What is the date of your last medical exam?: 01/08/24  Additional Assessments  Are these symptoms new, recurrent, or chronic?: new (last week)        ntReason for Disposition   Patient wants to be seen   Earache    Additional Information   Commented on: Drinking very little and dehydration suspected (e.g., no urine > 12 hours, very dry mouth, very lightheaded)     Does not remember if he urinated or not.    Protocols used: Dizziness-A-OH, Sinus Pain and Congestion-A-OH

## 2024-09-06 NOTE — TELEPHONE ENCOUNTER
Spouse Tatum(on HIPAA) called to confirm providers on ENT(ear, nose, throat) and dermatology referrals. Information below provided with phone numbers. Spouse verbalized understanding.     _______________________________________________________  Referred to Provider Information:  Provider Address Phone   Guillaume Nieves MD [address] [place] [id number] [phone]    _______________________________________________________  Referred to Provider Information:  Provider Address Phone   Mitchell Michaud MD [address] [place] [id number] [phone]

## 2024-09-08 DIAGNOSIS — R77.8 ELEVATED TOTAL PROTEIN: Primary | ICD-10-CM

## 2024-09-09 ENCOUNTER — OFFICE VISIT (OUTPATIENT)
Dept: OTOLARYNGOLOGY | Facility: CLINIC | Age: 69
End: 2024-09-09
Payer: MEDICARE

## 2024-09-09 ENCOUNTER — OFFICE VISIT (OUTPATIENT)
Dept: AUDIOLOGY | Facility: CLINIC | Age: 69
End: 2024-09-09

## 2024-09-09 DIAGNOSIS — H90.3 SENSORINEURAL HEARING LOSS (SNHL), BILATERAL: ICD-10-CM

## 2024-09-09 DIAGNOSIS — J32.9 RECURRENT SINUS INFECTIONS: ICD-10-CM

## 2024-09-09 DIAGNOSIS — R42 VERTIGO: Primary | ICD-10-CM

## 2024-09-09 DIAGNOSIS — H90.3 SENSORINEURAL HEARING LOSS, BILATERAL: Primary | ICD-10-CM

## 2024-09-09 PROCEDURE — 99214 OFFICE O/P EST MOD 30 MIN: CPT | Performed by: OTOLARYNGOLOGY

## 2024-09-09 PROCEDURE — 92567 TYMPANOMETRY: CPT | Performed by: AUDIOLOGIST

## 2024-09-09 PROCEDURE — 92557 COMPREHENSIVE HEARING TEST: CPT | Performed by: AUDIOLOGIST

## 2024-09-09 NOTE — PROGRESS NOTES
NEW PATIENT PROGRESS NOTE  OTOLOGY/OTOLARYNGOLOGY    REF MD:  Mello Xie MD  [address] [place] [address]    PCP: Mello Xie MD    CHIEF COMPLAINT:    Chief Complaint   Patient presents with    Sinus Problem     Patient is here for frequent sinus infection stuffy ears difficult to sleep reports pressure on the back of his ears x 4 days.         HISTORY OF PRESENT ILLNESS: Art Katz is a 69 year old male who presents for evaluation of vertigo and recurrent sinus infections. The patient was seen by his primary care provider on 9/6/2024 for ongoing nasal congestion and ear pain. He was referred her for further evaluation. He uses hearing aids but has not been wearing them due to the pain. Additionally, he has had a cough for a few weeks and experiences occasional episodes of dizziness and lightheadedness, which have become more frequent. He describes a boat-rocking sensation, as well as facial pain and headaches.  Endorses sneezing, uses nasal sprays and allergy medications. Patient follows with neurology for possible cognitive decline. He was recommended a sleep study by PCP due to possible sleep apnea. Previously had sleep apnea that resolved after significant weight loss following gastric bypass surgery. Patient since that time regained weight, so it was recommended that he had another Sleep study.        PAST MEDICAL HISTORY:    Past Medical History:    Calculus of kidney    COVID    Kidney stone    Obesity       PAST SURGICAL HISTORY:    Past Surgical History:   Procedure Laterality Date    Gastric bypass,obese<100cm bill-en-y  2010       Current Outpatient Medications on File Prior to Visit   Medication Sig Dispense Refill    amoxicillin 875 MG Oral Tab Take 1 tablet (875 mg total) by mouth 2 (two) times daily for 10 days. 20 tablet 0    Multiple Vitamin (ONE-DAILY MULTI VITAMINS) Oral Tab Take 1 tablet by mouth daily.      Omega-3 Fatty Acids (FISH OIL OR) Take 1 capsule by mouth  daily.      vitamin E 1000 UNITS Oral Cap Take 1 capsule (1,000 Units total) by mouth daily.       Current Facility-Administered Medications on File Prior to Visit   Medication Dose Route Frequency Provider Last Rate Last Admin    cyanocobalamin (Vitamin B12) 1000 MCG/ML injection 1,000 mcg  1,000 mcg Intramuscular Q30 Days Shawn Ramirez MD   1,000 mcg at 24 1122       Allergies: No Known Allergies    SOCIAL HISTORY:    Social History     Tobacco Use    Smoking status: Former     Current packs/day: 0.00     Types: Cigarettes     Quit date: 2000     Years since quittin.1     Passive exposure: Never    Smokeless tobacco: Never   Substance Use Topics    Alcohol use: Not Currently       FAMILY HISTORY: Denies known family history of hearing loss, tinnitus, vertigo, or migraine.  Denies known family history of head and neck cancer, thyroid cancer, bleeding disorders.     REVIEW OF SYSTEMS:   Positives are in bold  Neuro: Headache, facial weakness, facial numbness, neck pain, vertigo  ENT: Hearing change, tinnitus, otorrhea, otalgia, aural fullness, ear pressure, vertigo, imbalance  Sinus pressure, rhinorrhea, congestion, facial pain, jaw pain, dysphagia, odynophagia, sore throat, voice changes, shortness of breath    EXAMINATION:  I washed my hands with an alcohol-based hand gel prior to examination  Constitutional:   --Vitals: There were no vitals taken for this visit.  General: no apparent distress, well-developed, conversant  Respiratory: No stridor, stertor or increased work of breathing  ENT:  --Nose: no external nasal deformity, anterior rhinoscopy: Septum midline, inferior turbinate hypertrophy, mucosa edematous, no rhinorrhea  --OC/OP: No trismus. Crowded oropharynx. No masses or lesions noted over the gingiva, buccal mucosa, Large base of tongue,, FOM, hard/soft palate, tonsillar pillars, posterior pharyngeal wall. Tonsils are 1+ and soft. FOM/BOT are soft.    --Neck: No palpable cervical  lymphadenopathy, no thyromegaly, no masses or lesions over the bilateral submandibular or parotid glands  --Ear: (bilateral ears were examined under binocular microscopy)  Right ear microscopic exam:  Pinna: Normal, no lesions or masses.  Mastoid: Nontender on palpation.   External auditory canal: Clear, no masses or lesions.  Tympanic membrane: Intact, no lesions, normal landmarks.  Middle ear: Aerated.    Left ear microscopic exam:  Pinna: Normal, no lesions or masses.  Mastoid: Nontender on palpation.   External auditory canal: Clear, no masses or lesions.  Tympanic membrane: Intact, no lesions, normal landmarks.  Middle ear: Aerated.    Latest Audiogram Result (Hz) Exam performed: 9/9/2024 2:48 PM Last edited by Nisa Huff Au.D on 9/9/2024 2:58 PM        125 250  1500 2000 3000 4000 6000 8000    Right air:  25 25  40 50 65  70  80    Left air:  35 35  45 50 65  70  75    Left mastoid bone:       60  65      Right mastoid bone (masked):   25  35          Left mastoid bone (masked):   35  40             Reliability:  Fair    Transducer:  Inserts    Technique:  Conventional Audiometry    Comments:            Latest Speech Audiometry  Last edited by Nisa Huff Au.D on 9/9/2024 2:58 PM       Ear Method PTA SAT SRT [facility] Test/list Score (%) Intensity Mask/noise Notes    right live voice   40   Half-List 68 80  masked    left live voice   45   Half-List 72 80  masked                  Latest Tympanogram Result       Probe Tone (Hz): 226 Exam performed: 9/9/2024 2:49 PM Last edited by Nisa Huff Au.D on 9/9/2024 2:58 PM      Tympanograms  These were drawn by a user, not generated from device data      Right Ear Left Ear                     Right Ear Left Ear    Tympanogram type: Type A Type A    Canal volume (mL): 2.8 2.5    Peak pressure (daPa): 36 10    Peak amplitude (mL): 1.87 1.07    Tympanogram width (daPa):        Comments:                    Latest Audiogram and Tympanogram Result  Text  Last edited by Nisa Huff Au.D on 9/9/2024  3:02 PM      Addendum      Otoscopic Inspection:  both ears: no cerumen and TM visualized    Summary    SNHL bilaterally.      With this degree of hearing loss, patient would have difficulty hearing in most situations, and would not be able to hear most average conversational speech.      Pt is a candidate for amplification, if patient is motivated and medically cleared      Follow up with Guille Crook M.D..  Based on these test results and the report of communication difficulties, patient is an excellent hearing aid candidate.    Return to clinic for Hearing Aid Evaluation to further explore communication needs, hearing aid styles, options, costs and levels of technology.  Audiological monitoring as needed during the course of medical management.             Addended by Nisa Huff Au.D on 9/9/2024  3:02 PM                ASSESSMENT/PLAN:  Art Katz is a 69 year old male with     ICD-10-CM   1. Vertigo  R42   2. Recurrent sinus infections  J32.9   3. Sensorineural hearing loss (SNHL), bilateral  H90.3        IMPRESSION:  Bilateral SNHL  Recurrent sinusitis   Vertigo     PLAN:  -Audiogram reviewed with patient. Recommend an audiology evaluation for the fitting of new hearing aids. Patient is medically cleared for hearing aids   -Recommend MRI IACs (w+wo) contrast as well as VNG for further evaluation of vertigo and imbalance   -Consider neurology and allergy consultation pending test results  -Obtain CT Sinus to evaluate for sinus disease  -Follow up after testing    Situation reviewed with the patient in detail.    Attention: This note has been scribed by Zari Cantrell under the supervision of Guille Crook MD.     Guille Crook MD  Otology/Otolaryngology  [facility]   [address] [place] [address] [address] [place] [address]  Phone [phone]  Fax [phone]      I have personally performed the  services described in this documentation. All medical record entries made by the scribe were at my direction and in my presence. I have reviewed the chart and agree that the medical record reflects my personal performance and is accurate and complete.

## 2024-09-09 NOTE — PROGRESS NOTES
Subjective:     Patient ID: Art Katz is a 69 year old male.    HPI  Pt comes in with wife to establish care and with multiple complaints   complaints of on going nasal congestion and  pain in ears  Uses hearing aids, but due to pain he is not wearing them also with cough for few weeks  also occasional episodes of dizziness and lightheadedness which are coming more frequent per wife his memory has been getting worst pt also thinks that he is more forgetful, pt used to  to do B12 shot monthly, but has missed a couple of doses and would like to see if he should restart this. Also with hs of iron deficient anemia all this due to hs of gastric bypass surgery.   Pt alos has hs of yordan with fatigue will need new test   Patient also complains of ringing in the ears   Also patient with multiple skin lesions would like to see a dermatologist    History/Other:   Review of Systems   Constitutional:  Positive for fatigue.   HENT: Negative.     Eyes: Negative.    Respiratory: Negative.     Cardiovascular: Negative.    Gastrointestinal: Negative.    Genitourinary: Negative.    Musculoskeletal: Negative.    Skin: Negative.         Skin lesions    Neurological:  Positive for dizziness and weakness.   Psychiatric/Behavioral:  Positive for sleep disturbance.         Memory problems   Current Outpatient Medications   Medication Sig Dispense Refill   • amoxicillin 875 MG Oral Tab Take 1 tablet (875 mg total) by mouth 2 (two) times daily for 10 days. 20 tablet 0   • Multiple Vitamin (ONE-DAILY MULTI VITAMINS) Oral Tab Take 1 tablet by mouth daily.     • Omega-3 Fatty Acids (FISH OIL OR) Take 1 capsule by mouth daily.     • vitamin E 1000 UNITS Oral Cap Take 1 capsule (1,000 Units total) by mouth daily.       Allergies:No Known Allergies    Past Medical History:   • Calculus of kidney   • COVID   • Kidney stone   • Obesity      Past Surgical History:   Procedure Laterality Date   • Gastric bypass,obese<100cm bill-en-y  2010       Family History   Problem Relation Age of Onset   • Stroke Father    • Cancer Mother    • Dementia Mother    • Dementia Maternal Grandfather    • Depression Paternal Grandmother    • Other (Other) Brother         Brain tumor   • Other (other ) Brother         seizure      Social History:   Social History     Socioeconomic History   • Marital status:    Tobacco Use   • Smoking status: Former     Current packs/day: 0.00     Types: Cigarettes     Quit date: 2000     Years since quittin.1     Passive exposure: Never   • Smokeless tobacco: Never   Vaping Use   • Vaping status: Never Used   Substance and Sexual Activity   • Alcohol use: Not Currently   • Drug use: Not Currently        Objective:   Physical Exam  Vitals and nursing note reviewed.   Constitutional:       Appearance: He is well-developed.   HENT:      Head: Normocephalic and atraumatic.      Right Ear: External ear normal.      Left Ear: External ear normal.      Nose: Nose normal.   Eyes:      Conjunctiva/sclera: Conjunctivae normal.      Pupils: Pupils are equal, round, and reactive to light.   Cardiovascular:      Rate and Rhythm: Normal rate and regular rhythm.      Heart sounds: Normal heart sounds.   Pulmonary:      Effort: Pulmonary effort is normal.      Breath sounds: Normal breath sounds.   Abdominal:      General: Bowel sounds are normal.      Palpations: Abdomen is soft.   Genitourinary:     Penis: Normal.       Prostate: Normal.      Rectum: Normal.   Musculoskeletal:         General: Normal range of motion.      Cervical back: Normal range of motion and neck supple.   Skin:     General: Skin is warm and dry.      Comments: Skin lesions    Neurological:      Mental Status: He is alert and oriented to person, place, and time.      Deep Tendon Reflexes: Reflexes are normal and symmetric.     Assessment & Plan:   1. Encounter to establish care    2. Recurrent sinus infections    3. RENETTA (obstructive sleep apnea)    4. B12  deficiency    5. Iron deficiency anemia, unspecified iron deficiency anemia type [D50.9]    6. Dizzy spells    7. Tinnitus of both ears    8. History of gastric bypass    9. Insomnia, unspecified type    10. Memory problem    11. SOB (shortness of breath) on exertion    12. Other fatigue    13. Colonoscopy refused    14. Skin lesions        Orders Placed This Encounter   Procedures   • CBC With Differential With Platelet   • Comp Metabolic Panel (14)   • TSH W Reflex To Free T4   • Urinalysis, Routine   • Vitamin B12 [E]   • Ferritin [E]   • TRANSFERRIN [891][Q]   • Iron And Tibc [E]   • Occult Blood, Fecal, Immunoassay (Blue cards) [E]       Meds This Visit:  Requested Prescriptions     Signed Prescriptions Disp Refills   • amoxicillin 875 MG Oral Tab 20 tablet 0     Sig: Take 1 tablet (875 mg total) by mouth 2 (two) times daily for 10 days.       Imaging & Referrals:  NEURO - INTERNAL  OP [facility] REFERRAL DIAGOSTIC SLEEP STUDY ADULT  ENT - INTERNAL  [facility] - INTERNAL

## 2024-10-25 ENCOUNTER — APPOINTMENT (OUTPATIENT)
Dept: CT IMAGING | Facility: HOSPITAL | Age: 69
End: 2024-10-25
Attending: EMERGENCY MEDICINE
Payer: MEDICARE

## 2024-10-25 ENCOUNTER — HOSPITAL ENCOUNTER (OUTPATIENT)
Facility: HOSPITAL | Age: 69
Setting detail: OBSERVATION
Discharge: HOME | End: 2024-10-26
Attending: EMERGENCY MEDICINE | Admitting: INTERNAL MEDICINE
Payer: MEDICARE

## 2024-10-25 ENCOUNTER — APPOINTMENT (OUTPATIENT)
Dept: ULTRASOUND IMAGING | Facility: HOSPITAL | Age: 69
End: 2024-10-25
Payer: MEDICARE

## 2024-10-25 DIAGNOSIS — N20.1 RIGHT URETERAL STONE: Primary | ICD-10-CM

## 2024-10-25 LAB
ANION GAP SERPL CALC-SCNC: 8 MMOL/L (ref 0–18)
BASOPHILS # BLD AUTO: 0.05 X10(3) UL (ref 0–0.2)
BASOPHILS NFR BLD AUTO: 0.6 %
BILIRUB UR QL: NEGATIVE
BUN BLD-MCNC: 16 MG/DL (ref 9–23)
BUN/CREAT SERPL: 13.6 (ref 10–20)
CALCIUM BLD-MCNC: 9.2 MG/DL (ref 8.7–10.4)
CHLORIDE SERPL-SCNC: 107 MMOL/L (ref 98–112)
CLARITY UR: CLEAR
CO2 SERPL-SCNC: 25 MMOL/L (ref 21–32)
COLOR UR: YELLOW
CREAT BLD-MCNC: 1.18 MG/DL
DEPRECATED RDW RBC AUTO: 38.8 FL (ref 35.1–46.3)
EGFRCR SERPLBLD CKD-EPI 2021: 67 ML/MIN/1.73M2 (ref 60–?)
EOSINOPHIL # BLD AUTO: 0.05 X10(3) UL (ref 0–0.7)
EOSINOPHIL NFR BLD AUTO: 0.6 %
ERYTHROCYTE [DISTWIDTH] IN BLOOD BY AUTOMATED COUNT: 12.9 % (ref 11–15)
GLUCOSE BLD-MCNC: 131 MG/DL (ref 70–99)
GLUCOSE UR-MCNC: NORMAL MG/DL
HCT VFR BLD AUTO: 39.1 %
HGB BLD-MCNC: 13.3 G/DL
IMM GRANULOCYTES # BLD AUTO: 0.04 X10(3) UL (ref 0–1)
IMM GRANULOCYTES NFR BLD: 0.5 %
KETONES UR-MCNC: NEGATIVE MG/DL
LEUKOCYTE ESTERASE UR QL STRIP.AUTO: NEGATIVE
LYMPHOCYTES # BLD AUTO: 1.53 X10(3) UL (ref 1–4)
LYMPHOCYTES NFR BLD AUTO: 19.1 %
MCH RBC QN AUTO: 28.4 PG (ref 26–34)
MCHC RBC AUTO-ENTMCNC: 34 G/DL (ref 31–37)
MCV RBC AUTO: 83.4 FL
MONOCYTES # BLD AUTO: 0.83 X10(3) UL (ref 0.1–1)
MONOCYTES NFR BLD AUTO: 10.4 %
NEUTROPHILS # BLD AUTO: 5.5 X10 (3) UL (ref 1.5–7.7)
NEUTROPHILS # BLD AUTO: 5.5 X10(3) UL (ref 1.5–7.7)
NEUTROPHILS NFR BLD AUTO: 68.8 %
NITRITE UR QL STRIP.AUTO: NEGATIVE
OSMOLALITY SERPL CALC.SUM OF ELEC: 293 MOSM/KG (ref 275–295)
PH UR: 5.5 [PH] (ref 5–8)
PLATELET # BLD AUTO: 207 10(3)UL (ref 150–450)
POTASSIUM SERPL-SCNC: 4 MMOL/L (ref 3.5–5.1)
PROT UR-MCNC: 30 MG/DL
RBC # BLD AUTO: 4.69 X10(6)UL
RBC #/AREA URNS AUTO: >10 /HPF
SODIUM SERPL-SCNC: 140 MMOL/L (ref 136–145)
SP GR UR STRIP: >1.03 (ref 1–1.03)
UROBILINOGEN UR STRIP-ACNC: 2
WBC # BLD AUTO: 8 X10(3) UL (ref 4–11)

## 2024-10-25 PROCEDURE — 74176 CT ABD & PELVIS W/O CONTRAST: CPT | Performed by: EMERGENCY MEDICINE

## 2024-10-25 PROCEDURE — 93975 VASCULAR STUDY: CPT | Performed by: EMERGENCY MEDICINE

## 2024-10-25 PROCEDURE — 76870 US EXAM SCROTUM: CPT | Performed by: EMERGENCY MEDICINE

## 2024-10-25 PROCEDURE — 99222 1ST HOSP IP/OBS MODERATE 55: CPT | Performed by: HOSPITALIST

## 2024-10-25 RX ORDER — TAMSULOSIN HYDROCHLORIDE 0.4 MG/1
0.4 CAPSULE ORAL ONCE
Status: COMPLETED | OUTPATIENT
Start: 2024-10-25 | End: 2024-10-25

## 2024-10-25 RX ORDER — HYDRALAZINE HYDROCHLORIDE 20 MG/ML
10 INJECTION INTRAMUSCULAR; INTRAVENOUS EVERY 4 HOURS PRN
Status: DISCONTINUED | OUTPATIENT
Start: 2024-10-25 | End: 2024-10-26

## 2024-10-25 RX ORDER — KETOROLAC TROMETHAMINE 15 MG/ML
15 INJECTION, SOLUTION INTRAMUSCULAR; INTRAVENOUS ONCE
Status: COMPLETED | OUTPATIENT
Start: 2024-10-25 | End: 2024-10-25

## 2024-10-25 RX ORDER — CETIRIZINE HYDROCHLORIDE 10 MG/1
10 TABLET ORAL DAILY
COMMUNITY

## 2024-10-25 RX ORDER — ONDANSETRON 2 MG/ML
4 INJECTION INTRAMUSCULAR; INTRAVENOUS ONCE
Status: COMPLETED | OUTPATIENT
Start: 2024-10-25 | End: 2024-10-25

## 2024-10-25 RX ORDER — FAMOTIDINE 10 MG/ML
20 INJECTION, SOLUTION INTRAVENOUS 2 TIMES DAILY
Status: DISCONTINUED | OUTPATIENT
Start: 2024-10-25 | End: 2024-10-26

## 2024-10-25 RX ORDER — MORPHINE SULFATE 2 MG/ML
2 INJECTION, SOLUTION INTRAMUSCULAR; INTRAVENOUS EVERY 2 HOUR PRN
Status: DISCONTINUED | OUTPATIENT
Start: 2024-10-25 | End: 2024-10-26

## 2024-10-25 RX ORDER — MORPHINE SULFATE 4 MG/ML
4 INJECTION, SOLUTION INTRAMUSCULAR; INTRAVENOUS EVERY 2 HOUR PRN
Status: DISCONTINUED | OUTPATIENT
Start: 2024-10-25 | End: 2024-10-26

## 2024-10-25 RX ORDER — CHLORAL HYDRATE 500 MG
1000 CAPSULE ORAL DAILY
COMMUNITY

## 2024-10-25 RX ORDER — ONDANSETRON 2 MG/ML
4 INJECTION INTRAMUSCULAR; INTRAVENOUS EVERY 6 HOURS PRN
Status: DISCONTINUED | OUTPATIENT
Start: 2024-10-25 | End: 2024-10-26

## 2024-10-25 RX ORDER — SODIUM CHLORIDE 9 MG/ML
INJECTION, SOLUTION INTRAVENOUS CONTINUOUS
Status: DISCONTINUED | OUTPATIENT
Start: 2024-10-25 | End: 2024-10-26

## 2024-10-25 RX ORDER — ONDANSETRON 2 MG/ML
INJECTION INTRAMUSCULAR; INTRAVENOUS
Status: COMPLETED
Start: 2024-10-25 | End: 2024-10-25

## 2024-10-25 RX ORDER — KETOROLAC TROMETHAMINE 15 MG/ML
15 INJECTION, SOLUTION INTRAMUSCULAR; INTRAVENOUS EVERY 6 HOURS PRN
Status: DISCONTINUED | OUTPATIENT
Start: 2024-10-25 | End: 2024-10-26

## 2024-10-25 NOTE — ED PROVIDER NOTES
Patient Seen in: [facility] Emergency Department      History     Chief Complaint   Patient presents with    Groin Pain     Stated Complaint: Right Side Groin Pain    Subjective:   HPI      69-year-old male with history of right hydrocelectomy in  presents with right testicular pain, right flank pain.  Worsening pain over the last few days, coming in waves, severe, with right flank pain.  No vomiting, chest pain, shortness of breath, dysuria    Objective:     Past Medical History:    Calculus of kidney    COVID    Kidney stone    Obesity              Past Surgical History:   Procedure Laterality Date    Gastric bypass,obese<100cm bill-en-y                  Social History     Socioeconomic History    Marital status:    Tobacco Use    Smoking status: Former     Current packs/day: 0.00     Types: Cigarettes     Quit date: 2000     Years since quittin.2     Passive exposure: Never    Smokeless tobacco: Never   Vaping Use    Vaping status: Never Used   Substance and Sexual Activity    Alcohol use: Not Currently    Drug use: Not Currently                  Physical Exam     ED Triage Vitals [10/25/24 1726]   BP (!) 161/83   Pulse 65   Resp 24   Temp 97.9 °F (36.6 °C)   Temp src    SpO2 95 %   O2 Device None (Room air)       Current Vitals:   Vital Signs  BP: 159/82  Pulse: 58  Resp: 24  Temp: 97.9 °F (36.6 °C)  MAP (mmHg): (!) 103    Oxygen Therapy  SpO2: 97 %  O2 Device: None (Room air)        Physical Exam  Vital signs reviewed. Nursing note reviewed.  Constitutional: Well-developed. Well-nourished. In no acute distress  HENT: Mucous membranes moist.   EYES: No scleral icterus or conjunctival injection.  NECK: Full ROM. Supple.   CARDIAC: Normal rate. Normal S1/ S2. 2+ distal pulses. No edema  PULM/CHEST: Clear to auscultation bilaterally. No wheezes  ABD: Soft, non-tender, non-distended.   : No CVA tenderness. No scrotal tenderness or mass. No perineal  redness/warmth/tenderness  RECTAL: deferred  Extremities: No obvious deformity  NEURO: Awake, alert, following commands, moving extremities, answering questions.   SKIN: Warm and dry. No rash or lesions.  PSYCH: Normal judgment. Normal affect.        ED Course     Labs Reviewed   BASIC METABOLIC PANEL (8) - Abnormal; Notable for the following components:       Result Value    Glucose 131 (*)     All other components within normal limits   URINALYSIS WITH CULTURE REFLEX - Abnormal; Notable for the following components:    Spec Gravity >1.030 (*)     Blood Urine 2+ (*)     Protein Urine 30 (*)     Urobilinogen Urine 2 (*)     RBC Urine >10 (*)     All other components within normal limits   CBC WITH DIFFERENTIAL WITH PLATELET                   MDM      Assessment:Patient is a 69 year old male presenting to the ED due to right flank/testicular pain.    Comorbidities/chronic illnesses impacting care: ureteral stones, right hydrocelectomy    History obtained from: Patient    Laboratory results above were independently viewed and interpreted by me.  External records and previous hospitalization records reviewed and documented below      Radiography/Imaging:    CT ABDOMEN+PELVIS(CPT=74176)   Final Result   PROCEDURE:   CT ABDOMEN+PELVIS(CPT=74176)       COMPARISON:   [facility], CT ABDOMEN & PELVIS WO CON,    6/09/2013, 5:31 PM.  [facility], CT ABDOMEN & PELVIS W    CON, 6/09/2013, 0:41 AM.       INDICATIONS:   Right flank pain.       TECHNIQUE: CT images of the abdomen and pelvis were obtained without    intravenous contrast material.  Automated exposure control for dose    reduction was used. Adjustment of the mA and/or kV was done based on the    patient's size. Use of iterative    reconstruction technique for dose reduction was used.  Dose information is    transmitted to the ACR (American College of Radiology) NRDR (National    Radiology Data Registry) which includes the Dose Index  Registry.       FINDINGS:       Evaluation of parenchymal organs is limited due to lack of IV contrast.       LIVER: Diffuse low attenuation seen throughout the liver compatible with    fatty infiltration.   GALLBLADDER: Unremarkable   BILIARY: No gross ductal dilation   SPLEEN: Unremarkable   PANCREAS: There is atrophy of the pancreatic parenchyma.   ADRENALS: Unremarkable   KIDNEYS: There is a 0.8 cm obstructing calculus within the proximal right    ureter with moderate right hydroureteronephrosis.  There is extensive    right perinephric and periureteric inflammatory fat stranding.  There is a    2.6 cm simple right interpolar    renal cyst.  0.9 cm nonobstructing left lower pole caliceal calculus.  No    left hydronephrosis.   AORTA/VASCULAR:   No aneurysm   RETROPERITONEUM: No mass or enlarged adenopathy.     BOWEL:  Small hiatal hernia with wall thickening at the gastroesophageal    junction.  Postoperative changes of Morgan-en-Y gastric bypass. Although the    appendix is not visualized, there are no inflammatory changes in the right    lower quadrant to suggest acute    appendicitis. Remainder of the bowel is otherwise unremarkable without    dilation or wall thickening.   MESENTERY: No mass or hernia.      PELVIS: There is a small right inguinal hernia containing fat. No bowel    herniation.  No mass or fluid collection.  No enlarged lymph nodes.   BONES:   No significant bony lesion or fracture.   LUNG BASES: No visible pulmonary or pleural disease.                   =====   CONCLUSION:        8 mm obstructing calculus within the mid right ureter with moderate right    hydroureteronephrosis.       Significant right perinephric stranding raises the possibility of    superimposed infection.         Nonobstructing left caliceal calculus.       Multiple other incidental findings as described in the body of the report.                    Dictated by (CST): Delroy Blas MD on 10/25/2024 at 8:07 PM        Finalized  by (CST): Delroy Blas MD on 10/25/2024 at 8:12 PM               US SCROTUM W/ DOPPLER (CPT=93975/37639)   Final Result   PROCEDURE: US SCROTUM W/DOPPLER (CPT=93975/46008)       COMPARISON: None.       INDICATIONS: Right Side Groin Pain, Hx of genital cyst removal       TECHNIQUE: The scrotum was evaluated with gray scale and color duplex    Doppler sonography.       FINDINGS:        RIGHT   TESTICLE: 4.8 x 2.4 x 3.8 cm.  Homogeneous echogenicity.  No masses.     EPIDIDYMIS: There is a simple-appearing 1.2 x 0.6 x 0.8 cm right    epididymal head cyst.  Epididymis otherwise demonstrates homogeneous    echotexture.   OTHER: No varicocele or hydrocele.     DOPPLER: Preserved arterial and venous flow in the testicle with color and    pulsed Doppler.  Preserved epididymal flow.         LEFT   TESTICLE: 3.8 x 3.2 x 3.2 cm.  Homogeneous echogenicity.  No masses.     EPIDIDYMIS: Left epididymis is not well seen, but does not appear    significantly enlarged.   OTHER: Small varicocele.  Trace hydrocele.   DOPPLER: Preserved arterial and venous flow in the testicle with color    pulsed Doppler.  Preserved epididymal flow.                     =====   CONCLUSION:    1. Unremarkable sonographic assessment of the testicles.  No evidence of    testicular torsion.  No suspicious testicular mass.  No evidence of    epididymo-orchitis.   2. There is a 1.2 cm right epididymal head cyst, which is likely    incidental.  Please note that the left epididymis is not well seen, but    does not appear enlarged or hypervascular.   3. Small left varicocele and trace left hydrocele.   4. Lesser incidental findings as above.           Dictated by (CST): Rupesh Griffin MD on 10/25/2024 at 7:06 PM        Finalized by (CST): Rupesh Griffin MD on 10/25/2024 at 7:09 PM                 Imaging result above were independently viewed and interpreted by me.    Consideration of Social Determinants of Health and Impact on Medical Decision  Making:  Housing/Transportation/Financial Strain/Access to healthcare/Food insecurity/family or Community support/Language and Literacy/Substance abuse/Mental health concerns/Disabilities     -None    ED course  Patient arrives here hypertensive.  He appears uncomfortable but nontoxic.  His testicular and perineal exams are both unremarkable.  No signs of cellulitis, mass.  Concern though for possible torsion though seems less likely, kidney stone, hernia.  Will check scrotal ultrasound, CT flank, labs, urinalysis, pain control.    Progress note: Labs reviewed, urinalysis shows hematuria but no sign of infection.  No leukocytosis.  CT imaging reviewed, shows negative scrotal ultrasound, but does have 8 mm right mid ureter stone with moderate hydronephrosis.  With his duration of pain, size of stone and location, feel unlikely to pass on its own.  Urology consulted, will admit.            Medications   ketorolac (Toradol) 15 MG/ML injection 15 mg (15 mg Intravenous Given 10/25/24 1855)   ondansetron (Zofran) 4 MG/2ML injection 4 mg (4 mg Intravenous Given 10/25/24 1954)             Admission disposition: 10/25/2024  8:52 PM           Medical Decision Making      Disposition and Plan     Clinical Impression:  1. Right ureteral stone         Disposition:  Admit  10/25/2024  8:52 pm    Follow-up:  No follow-up provider specified.  We recommend that you schedule follow up care with a primary care provider within the next three months to obtain basic health screening including reassessment of your blood pressure.      Medications Prescribed:  Current Discharge Medication List              Supplementary Documentation:         Hospital Problems       Present on Admission  Date Reviewed: 9/16/2024   None

## 2024-10-25 NOTE — ED INITIAL ASSESSMENT (HPI)
S: Pt presents to ED with right abdominal pain and right testicle pain. Pt states that a few years ago he had right testicle pain and needed sx to remove a cyst there.

## 2024-10-26 ENCOUNTER — TELEPHONE (OUTPATIENT)
Dept: SURGERY | Facility: CLINIC | Age: 69
End: 2024-10-26

## 2024-10-26 ENCOUNTER — APPOINTMENT (OUTPATIENT)
Dept: GENERAL RADIOLOGY | Facility: HOSPITAL | Age: 69
End: 2024-10-26
Attending: UROLOGY
Payer: MEDICARE

## 2024-10-26 ENCOUNTER — ANESTHESIA (OUTPATIENT)
Dept: SURGERY | Facility: HOSPITAL | Age: 69
End: 2024-10-26
Payer: MEDICARE

## 2024-10-26 ENCOUNTER — ANESTHESIA EVENT (OUTPATIENT)
Dept: SURGERY | Facility: HOSPITAL | Age: 69
End: 2024-10-26
Payer: MEDICARE

## 2024-10-26 VITALS
TEMPERATURE: 99 F | DIASTOLIC BLOOD PRESSURE: 68 MMHG | WEIGHT: 315 LBS | SYSTOLIC BLOOD PRESSURE: 112 MMHG | OXYGEN SATURATION: 98 % | RESPIRATION RATE: 14 BRPM | HEART RATE: 70 BPM | BODY MASS INDEX: 49 KG/M2

## 2024-10-26 DIAGNOSIS — N20.0 KIDNEY STONE: Primary | ICD-10-CM

## 2024-10-26 PROCEDURE — 0T768DZ DILATION OF RIGHT URETER WITH INTRALUMINAL DEVICE, VIA NATURAL OR ARTIFICIAL OPENING ENDOSCOPIC: ICD-10-PCS | Performed by: UROLOGY

## 2024-10-26 PROCEDURE — 99239 HOSP IP/OBS DSCHRG MGMT >30: CPT | Performed by: INTERNAL MEDICINE

## 2024-10-26 PROCEDURE — 99223 1ST HOSP IP/OBS HIGH 75: CPT | Performed by: UROLOGY

## 2024-10-26 PROCEDURE — BT1DYZZ FLUOROSCOPY OF RIGHT KIDNEY, URETER AND BLADDER USING OTHER CONTRAST: ICD-10-PCS | Performed by: UROLOGY

## 2024-10-26 DEVICE — URETERAL STENT
Type: IMPLANTABLE DEVICE | Site: URETER | Status: FUNCTIONAL
Brand: ASCERTA™

## 2024-10-26 RX ORDER — PHENAZOPYRIDINE HYDROCHLORIDE 100 MG/1
100 TABLET, FILM COATED ORAL 3 TIMES DAILY PRN
Qty: 10 TABLET | Refills: 0 | Status: SHIPPED | OUTPATIENT
Start: 2024-10-26

## 2024-10-26 RX ORDER — HYDROCODONE BITARTRATE AND ACETAMINOPHEN 5; 325 MG/1; MG/1
1 TABLET ORAL EVERY 4 HOURS PRN
Qty: 10 TABLET | Refills: 0 | Status: SHIPPED | OUTPATIENT
Start: 2024-10-26

## 2024-10-26 RX ORDER — SODIUM CHLORIDE, SODIUM LACTATE, POTASSIUM CHLORIDE, CALCIUM CHLORIDE 600; 310; 30; 20 MG/100ML; MG/100ML; MG/100ML; MG/100ML
INJECTION, SOLUTION INTRAVENOUS CONTINUOUS
Status: DISCONTINUED | OUTPATIENT
Start: 2024-10-26 | End: 2024-10-26 | Stop reason: HOSPADM

## 2024-10-26 RX ORDER — LIDOCAINE HYDROCHLORIDE 10 MG/ML
INJECTION, SOLUTION EPIDURAL; INFILTRATION; INTRACAUDAL; PERINEURAL AS NEEDED
Status: DISCONTINUED | OUTPATIENT
Start: 2024-10-26 | End: 2024-10-26 | Stop reason: SURG

## 2024-10-26 RX ORDER — OXYBUTYNIN CHLORIDE 5 MG/1
5 TABLET ORAL 3 TIMES DAILY PRN
Qty: 15 TABLET | Refills: 0 | Status: SHIPPED | OUTPATIENT
Start: 2024-10-26

## 2024-10-26 RX ORDER — MIDAZOLAM HYDROCHLORIDE 1 MG/ML
INJECTION INTRAMUSCULAR; INTRAVENOUS AS NEEDED
Status: DISCONTINUED | OUTPATIENT
Start: 2024-10-26 | End: 2024-10-26 | Stop reason: SURG

## 2024-10-26 RX ORDER — MORPHINE SULFATE 4 MG/ML
2 INJECTION, SOLUTION INTRAMUSCULAR; INTRAVENOUS EVERY 10 MIN PRN
Status: DISCONTINUED | OUTPATIENT
Start: 2024-10-26 | End: 2024-10-26 | Stop reason: HOSPADM

## 2024-10-26 RX ORDER — HYDROMORPHONE HYDROCHLORIDE 1 MG/ML
0.2 INJECTION, SOLUTION INTRAMUSCULAR; INTRAVENOUS; SUBCUTANEOUS EVERY 5 MIN PRN
Status: DISCONTINUED | OUTPATIENT
Start: 2024-10-26 | End: 2024-10-26 | Stop reason: HOSPADM

## 2024-10-26 RX ORDER — NALOXONE HYDROCHLORIDE 0.4 MG/ML
80 INJECTION, SOLUTION INTRAMUSCULAR; INTRAVENOUS; SUBCUTANEOUS AS NEEDED
Status: DISCONTINUED | OUTPATIENT
Start: 2024-10-26 | End: 2024-10-26 | Stop reason: HOSPADM

## 2024-10-26 RX ORDER — SODIUM CHLORIDE, SODIUM LACTATE, POTASSIUM CHLORIDE, CALCIUM CHLORIDE 600; 310; 30; 20 MG/100ML; MG/100ML; MG/100ML; MG/100ML
INJECTION, SOLUTION INTRAVENOUS CONTINUOUS PRN
Status: DISCONTINUED | OUTPATIENT
Start: 2024-10-26 | End: 2024-10-26 | Stop reason: SURG

## 2024-10-26 RX ORDER — HYDROMORPHONE HYDROCHLORIDE 1 MG/ML
0.6 INJECTION, SOLUTION INTRAMUSCULAR; INTRAVENOUS; SUBCUTANEOUS EVERY 5 MIN PRN
Status: DISCONTINUED | OUTPATIENT
Start: 2024-10-26 | End: 2024-10-26 | Stop reason: HOSPADM

## 2024-10-26 RX ORDER — SULFAMETHOXAZOLE AND TRIMETHOPRIM 800; 160 MG/1; MG/1
1 TABLET ORAL 2 TIMES DAILY
Qty: 6 TABLET | Refills: 0 | Status: SHIPPED | OUTPATIENT
Start: 2024-10-26 | End: 2024-10-29

## 2024-10-26 RX ORDER — MORPHINE SULFATE 10 MG/ML
6 INJECTION, SOLUTION INTRAMUSCULAR; INTRAVENOUS EVERY 10 MIN PRN
Status: DISCONTINUED | OUTPATIENT
Start: 2024-10-26 | End: 2024-10-26 | Stop reason: HOSPADM

## 2024-10-26 RX ORDER — MORPHINE SULFATE 4 MG/ML
4 INJECTION, SOLUTION INTRAMUSCULAR; INTRAVENOUS EVERY 10 MIN PRN
Status: DISCONTINUED | OUTPATIENT
Start: 2024-10-26 | End: 2024-10-26 | Stop reason: HOSPADM

## 2024-10-26 RX ORDER — HYDROMORPHONE HYDROCHLORIDE 1 MG/ML
0.4 INJECTION, SOLUTION INTRAMUSCULAR; INTRAVENOUS; SUBCUTANEOUS EVERY 5 MIN PRN
Status: DISCONTINUED | OUTPATIENT
Start: 2024-10-26 | End: 2024-10-26 | Stop reason: HOSPADM

## 2024-10-26 RX ORDER — POLYETHYLENE GLYCOL 3350 17 G/17G
17 POWDER, FOR SOLUTION ORAL DAILY PRN
Qty: 20 PACKET | Refills: 1 | Status: SHIPPED | OUTPATIENT
Start: 2024-10-26

## 2024-10-26 RX ORDER — TAMSULOSIN HYDROCHLORIDE 0.4 MG/1
0.4 CAPSULE ORAL EVERY EVENING
Qty: 14 CAPSULE | Refills: 0 | Status: SHIPPED | OUTPATIENT
Start: 2024-10-26 | End: 2024-11-09

## 2024-10-26 RX ADMIN — LIDOCAINE HYDROCHLORIDE 50 MG: 10 INJECTION, SOLUTION EPIDURAL; INFILTRATION; INTRACAUDAL; PERINEURAL at 14:01:00

## 2024-10-26 RX ADMIN — MIDAZOLAM HYDROCHLORIDE 2 MG: 1 INJECTION INTRAMUSCULAR; INTRAVENOUS at 13:58:00

## 2024-10-26 RX ADMIN — SODIUM CHLORIDE, SODIUM LACTATE, POTASSIUM CHLORIDE, CALCIUM CHLORIDE: 600; 310; 30; 20 INJECTION, SOLUTION INTRAVENOUS at 13:58:00

## 2024-10-26 NOTE — ANESTHESIA PREPROCEDURE EVALUATION
Anesthesia PreOp Note    HPI:     Art Katz is a 69 year old male who presents for preoperative consultation requested by: Bert Norton MD    Date of Surgery: 10/25/2024 - 10/26/2024    Procedure(s):  CYSTOSCOPY RIGHT RETROGRADE PYELOGRAM, INSERTION OF RIGHT URETERAL STENT  Indication: Right ureteral calculus [N20.1]    Relevant Problems   No relevant active problems       NPO:  Last Liquid Consumption Date: 10/26/24  Last Liquid Consumption Time: 0700  Last Solid Consumption Date: 10/25/24  Last Solid Consumption Time: 1200  Last Liquid Consumption Date: 10/26/24          History Review:  Patient Active Problem List    Diagnosis Date Noted    Right ureteral stone 10/25/2024    Sensorineural hearing loss, bilateral 09/09/2024    Dizzy spells 09/06/2024    Memory problem 09/27/2023    History of gastric bypass 09/27/2023    History of sleep apnea 09/27/2023    Family history of dementia 09/27/2023    Acute recurrent sinusitis 09/27/2023    Anemia 08/26/2020    Class 3 severe obesity due to excess calories without serious comorbidity with body mass index (BMI) of 45.0 to 49.9 in adult (HCC) 07/23/2020    Right hydrocele 07/23/2020    Fatigue 07/23/2020    Seasonal allergies 07/23/2020       Past Medical History:    Calculus of kidney    COVID    Kidney stone    Obesity       Past Surgical History:   Procedure Laterality Date    Gastric bypass,obese<100cm [id number]  2010       Prescriptions Prior to Admission[1]  Current Medications and Prescriptions Ordered in Epic[2]    Allergies[3]    Family History   Problem Relation Age of Onset    Stroke Father     Cancer Mother     Dementia Mother     Dementia Maternal Grandfather     Depression Paternal Grandmother     Other (Other) Brother         Brain tumor    Other (other ) Brother         seizure     Social History     Socioeconomic History    Marital status:    Tobacco Use    Smoking status: Former     Current packs/day: 0.00     Types: Cigarettes      Quit date: 2000     Years since quittin.2     Passive exposure: Never    Smokeless tobacco: Never   Vaping Use    Vaping status: Never Used   Substance and Sexual Activity    Alcohol use: Not Currently    Drug use: Not Currently       Available pre-op labs reviewed.  Lab Results   Component Value Date    WBC 8.0 10/25/2024    RBC 4.69 10/25/2024    HGB 13.3 10/25/2024    HCT 39.1 10/25/2024    MCV 83.4 10/25/2024    MCH 28.4 10/25/2024    MCHC 34.0 10/25/2024    RDW 12.9 10/25/2024    .0 10/25/2024     Lab Results   Component Value Date     10/25/2024    K 4.0 10/25/2024     10/25/2024    CO2 25.0 10/25/2024    BUN 16 10/25/2024    CREATSERUM 1.18 10/25/2024     (H) 10/25/2024    CA 9.2 10/25/2024          Vital Signs:  Body mass index is 49.49 kg/m².   weight is 147.6 kg (325 lb 8 oz) (abnormal). His oral temperature is 97.9 °F (36.6 °C). His blood pressure is 137/86 and his pulse is 69. His respiration is 18 and oxygen saturation is 94%.   Vitals:    10/25/24 2204 10/26/24 0506 10/26/24 0818 10/26/24 1334   BP:  119/58 121/72 137/86   Pulse:  63 67 69   Resp:  16 18 18   Temp:  98.4 °F (36.9 °C) 98 °F (36.7 °C) 97.9 °F (36.6 °C)   TempSrc:  Oral Oral Oral   SpO2:  94% 94%    Weight: (!) 147.6 kg (325 lb 8 oz)           Anesthesia Evaluation     Patient summary reviewed and Nursing notes reviewed    No history of anesthetic complications   Airway   Mallampati: II  Neck ROM: full  Dental      Pulmonary - negative ROS and normal exam   Cardiovascular - negative ROS and normal exam    Neuro/Psych - negative ROS     GI/Hepatic/Renal - negative ROS     Endo/Other - negative ROS   Abdominal  - normal exam  (+) obese                 Anesthesia Plan:   ASA:  3  Plan:   General  Airway:  LMA  Post-op Pain Management: IV analgesics  Informed Consent Plan and Risks Discussed With:  Patient      I have informed Art Katz and/or legal guardian or family member of the nature of the  anesthetic plan, benefits, risks including possible dental damage if relevant, major complications, and any alternative forms of anesthetic management.   All of the patient's questions were answered to the best of my ability. The patient desires the anesthetic management as planned.  Kimo Hess MD  10/26/2024 1:55 PM  Present on Admission:  **None**           [1]   Facility-Administered Medications Prior to Admission   Medication Dose Route Frequency Provider Last Rate Last Admin    [] cyanocobalamin (Vitamin B12) 1000 MCG/ML injection 1,000 mcg  1,000 mcg Intramuscular Q30 Days Shawn Ramirez MD   1,000 mcg at 24 1122     Medications Prior to Admission   Medication Sig Dispense Refill Last Dose/Taking    omega-3 fatty acids 1000 MG Oral Cap Take 1,000 mg by mouth daily.   10/25/2024 at  9:00 AM    cetirizine 10 MG Oral Tab Take 1 tablet (10 mg total) by mouth daily.   10/25/2024 at  9:00 AM    Multiple Vitamin (ONE-DAILY MULTI VITAMINS) Oral Tab Take 1 tablet by mouth daily.   10/25/2024 at  9:00 AM    vitamin E 1000 UNITS Oral Cap Take 1 capsule (1,000 Units total) by mouth daily.   10/25/2024 at  9:00 AM   [2]   Current Facility-Administered Medications Ordered in Epic   Medication Dose Route Frequency Provider Last Rate Last Admin    cefTRIAXone (Rocephin) 2 g in sodium chloride 0.9% 100 mL IVPB-ADDV  2 g Intravenous Q24H Bert Norton  mL/hr at 10/26/24 1316 2 g at 10/26/24 1316    [Transfer Hold] ondansetron (Zofran) 4 MG/2ML injection 4 mg  4 mg Intravenous Q6H PRN Kae Uribe MD   4 mg at 10/26/24 0623    [Transfer Hold] morphINE PF 2 MG/ML injection 2 mg  2 mg Intravenous Q2H PRN Kae Uribe MD   2 mg at 10/26/24 0506    Or    [Transfer Hold] morphINE PF 4 MG/ML injection 4 mg  4 mg Intravenous Q2H PRN Kae Uribe MD        [Transfer Hold] hydrALAzine (Apresoline) 20 mg/mL injection 10 mg  10 mg Intravenous Q4H PRN Kae Uribe MD        sodium chloride  0.9% infusion   Intravenous Continuous Kae Uribe  mL/hr at 10/26/24 0510 New Bag at 10/26/24 0510    [Transfer Hold] ketorolac (Toradol) 15 MG/ML injection 15 mg  15 mg Intravenous Q6H PRN Kae Uribe MD        [Transfer Hold] famotidine (Pepcid) 20 mg/2mL injection 20 mg  20 mg Intravenous BID Kea Uribe MD   20 mg at 10/26/24 0821    influenza virus trivalent high dose PF (Fluzone HD) 0.5 mL injection (ages >/= 65 years) 0.5 mL  0.5 mL Intramuscular Prior to discharge Cristina Guzman MD         No current Epic-ordered outpatient medications on file.   [3]   Allergies  Allergen Reactions    Seasonal OTHER (SEE COMMENTS)     Runny nose

## 2024-10-26 NOTE — DISCHARGE INSTRUCTIONS
You had cystoscopy, ureteroscopy, and stent placement in the operating room today.    Instructions:    - No heavy lifting or strenuous activity for 1 day. You may resume regular activity tomorrow.    - The office will contact you to schedule your next surgery for kidney stone removal.  If you do not hear from the clinic after a few days or you need to change your appointment time or date, please contact us at [phone].       -  You have a stent (small plastic tube) inside your kidney and ureter to allow the swelling from surgery to resolve. This is only temporary and must be removed, so you should not forget about it. This will be exchanged at next surgery and removed once all stone is out.    - take the antibiotics as prescribed.      - With a ureteral stent in place you may have some discomfort on your side/flank. You can feel pain worsen when you urinate, so try to avoid holding urine and void every 2-3 hours. You also may notice increased blood in the urine as you increase your activity. If this happens increase your hydration and take it easy for a day. Warm baths/hot packs can help with the discomfort as well as your prescribed medications and Advil/Motrin (if you are able to take these).     - You may experience mild pain after the procedure for a few days.  If the pain becomes intolerable please contact our office or go to the nearest Emergency Room or Urgent Care. You should take over the counter ibuprofen (AKA motrin, advil) for mild pain (provided you do not have a medical condition such as stomach ulcers or kidney disease which prohibits you from taking these). You may alternate this with tylenol as well. If pain is still not relieved by tylenol and/or ibuprofen, you may take narcotic pain medication if prescribed (typically oxycodone or tramadol). If you are taking narcotic pain medication this can make you constipated, so you should take over the counter stool softeners or miralax if  prescribed.    - Warm pack or hot baths often help with discomfort after cystoscopy.    - If you take blood thinners (such as aspirin or plavix) please hold these medications until 3 days after surgery.     - You may experience burning and frequency of urination over the next few days. This will improve after a few days if you stay well hydrated. If you were prescribed phenazopyridine (Pyridium) this may relieve urinary discomfort but you can only take this for 3 days. Pyridium will make your urine orange.     - You are likely to see some blood in your urine (pink or light red urine) that should clear up within a few days. Staying well hydrated should help this clear up. If you notice the urine stays dark red or there are multiple large blood clots despite good hydration, please call the urology clinic ([phone]).     - Try to abstain from alcohol, coffee, tea, artificial sweeteners, and spicy food for the next 48 hours as these can irritate the bladder.     - If you develop fevers / chills, difficulty urinating, or abdominal pain that does not improve with pain medications, please call the office.     - Drink 1.5 to 2 liters of fluid today (water is preferable). If you are on a fluid restriction due to other medical reasons then you need to adhere to your fluid restriction recommendations.      Bert Norton MD  Staff Urologist  [facility]  Office: [phone]

## 2024-10-26 NOTE — ED QUICK NOTES
Orders for admission, patient is aware of plan and ready to go upstairs. Any questions, please call ED RN Monet at extension [phone].     Patient Covid vaccination status: Fully vaccinated     COVID Test Ordered in ED: None    COVID Suspicion at Admission: N/A    Running Infusions:  None    Mental Status/LOC at time of transport: AOx4    Other pertinent information:   CIWA score: N/A   NIH score:  N/A

## 2024-10-26 NOTE — OPERATIVE REPORT
Urology Operative Note    Attending Surgeon: Bert Norton MD    Assistant Surgeon: None    Patient Name: Art Katz    Date of Surgery: 10/26/2024    Preoperative Diagnosis: RIGHT ureteral obstruction    Postoperative Diagnosis: Same    Procedure Performed: Cystoscopy, RIGHT retrograde pyelogram and ureteral stent placement  [id number]    Indication:  Patient is a 69 year old male who presented with obstructing right ureteral stone with pain. The patient was counseled on options, risks, and benefits and elected to undergo the above procedure. We discussed risks including, but not limited to, bleeding, infection, damage to surrounding structures, need for repeat procedure(s) (including inability to place a stent and need for nephrostomy tube). The patient understood these risks and wished to proceed with surgery.    Findings:  Normal urethra; mildly enlarged prostate. No bladder lesions; mildly trabeculated. Right RPG with moderate hydronephrosis. Right ureteral stent (6x28 JJ) placed without issue. No significant purulence on cannulation.    Procedure:  The patient was taken to the operating room and a timeout was performed confirming the correct patient and procedure. The patient was prepped and draped in lithotomy position after undergoing anesthesia. Pre-operative prophylactic antibiotics were given in the form of Ceftriaxone.    The cystoscope was inserted per urethra and the bladder was inspected and drained. The right ureter was cannulated with a Sensor wire, and the wire was passed into the renal pelvis under fluoroscopy. A 5-Setswana open ended catheter was passed over the wire and into the mid ureter, and then the wire was removed.   A retrograde pyelogram was performed with contrast, showing moderate hydronephrosis and radiopaque stone in proximal ureter. The sensor wire was reinserted into the open ended catheter and into the kidney. The open ended catheter was then removed. There was no purulence or  cloudy urine upon cannulation.     A 6-German x 28 cm JJ ureteral stent was inserted over the wire. The proximal coil was formed in the kidney under fluoroscopic guidance. The distal coil was formed within the bladder under direct cystoscopic visualization. The stent string was removed prior to stent placement.     The bladder was drained and the cystoscope was removed. The patient was awoken from anesthesia and transferred to PACU in stable condition. The patient tolerated the procedure well. All instrument/supply counts were correct at the end of the case.    Specimens:   None     Estimated Blood Loss:  2 mL    Tubes/Drains:  6-German x 28 cm JJ RIGHT ureteral stent    Complications:   None immediate    Condition from OR:  Stable    Plan:   Gen diet  Ok to DC home this pm if voiding and pain controlled  Abx x3 days   Will arrange definitive stone surgery in a few weeks         Bert Norton MD  Staff Urologist  [facility]  Office: [phone]

## 2024-10-26 NOTE — ANESTHESIA POSTPROCEDURE EVALUATION
Patient: Art Katz    Procedure Summary       Date: 10/26/24 Room / Location: [facility] MAIN OR 14 / [facility] MAIN OR    Anesthesia Start: 1358 Anesthesia Stop: 1429    Procedure: CYSTOSCOPY RIGHT RETROGRADE PYELOGRAM, INSERTION OF RIGHT URETERAL STENT (Right) Diagnosis:       Right ureteral calculus      (Right ureteral calculus [N20.1])    Surgeons: Bert Norton MD Anesthesiologist: Kimo Hess MD    Anesthesia Type: general ASA Status: 3            Anesthesia Type: general    Vitals Value Taken Time   /66 10/26/24 1429   Temp 98.1 10/26/24 1429   Pulse 72 10/26/24 1428   Resp 9 10/26/24 1428   SpO2 95 % 10/26/24 1428   Vitals shown include unfiled device data.    [facility] AN Post Evaluation:   Patient Evaluated in PACU  Patient Participation: complete - patient participated  Level of Consciousness: awake  Pain Management: adequate  Airway Patency:patent  Dental exam unchanged from preop  Yes    Cardiovascular Status: acceptable  Respiratory Status: acceptable  Postoperative Hydration acceptable      Kimo Hess MD  10/26/2024 2:29 PM

## 2024-10-26 NOTE — PLAN OF CARE
Pt alert and oriented. Vitals stable. R uretal stent placed with Dr Norton today. No complaints of pain per patient. Pt is urinating. Cleared for discharge to home. Follow up instructions provided.   Problem: Patient Centered Care  Goal: Patient preferences are identified and integrated in the patient's plan of care  Description: Interventions:  - What would you like us to know as we care for you? \"I am from home with my family.\"  - Provide timely, complete, and accurate information to patient/family  - Incorporate patient and family knowledge, values, beliefs, and cultural backgrounds into the planning and delivery of care  - Encourage patient/family to participate in care and decision-making at the level they choose  - Honor patient and family perspectives and choices  Outcome: Adequate for Discharge     Problem: Patient/Family Goals  Goal: Patient/Family Long Term Goal  Description: Patient's Long Term Goal: \"to go home\"    Interventions:  -Follow up MD appt  -Take meds as prescribed  -Use of assistive device if needed  - See additional Care Plan goals for specific interventions  Outcome: Adequate for Discharge  Goal: Patient/Family Short Term Goal  Description: Patient's Short Term Goal: \"to prevent pain and infection\"    Interventions:   -Monitor VS  -Check labs results  -Administer IVF as ordered  -Provide pain meds as prescribed  - See additional Care Plan goals for specific interventions  Outcome: Adequate for Discharge     Problem: GENITOURINARY - ADULT  Goal: Absence of urinary retention  Description: INTERVENTIONS:  - Assess patient’s ability to void and empty bladder  - Monitor intake/output and perform bladder scan as needed  - Follow urinary retention protocol/standard of care  - Consider collaborating with pharmacy to review patient's medication profile  - Implement strategies to promote bladder emptying  Outcome: Adequate for Discharge     Problem: PAIN - ADULT  Goal: Verbalizes/displays adequate  comfort level or patient's stated pain goal  Description: INTERVENTIONS:  - Encourage pt to monitor pain and request assistance  - Assess pain using appropriate pain scale  - Administer analgesics based on type and severity of pain and evaluate response  - Implement non-pharmacological measures as appropriate and evaluate response  - Consider cultural and social influences on pain and pain management  - Manage/alleviate anxiety  - Utilize distraction and/or relaxation techniques  - Monitor for opioid side effects  - Notify MD/LIP if interventions unsuccessful or patient reports new pain  - Anticipate increased pain with activity and pre-medicate as appropriate  Outcome: Adequate for Discharge     Problem: RISK FOR INFECTION - ADULT  Goal: Absence of fever/infection during anticipated neutropenic period  Description: INTERVENTIONS  - Monitor WBC  - Administer growth factors as ordered  - Implement neutropenic guidelines  Outcome: Adequate for Discharge     Problem: SAFETY ADULT - FALL  Goal: Free from fall injury  Description: INTERVENTIONS:  - Assess pt frequently for physical needs  - Identify cognitive and physical deficits and behaviors that affect risk of falls.  - [place] fall precautions as indicated by assessment.  - Educate pt/family on patient safety including physical limitations  - Instruct pt to call for assistance with activity based on assessment  - Modify environment to reduce risk of injury  - Provide assistive devices as appropriate  - Consider OT/PT consult to assist with strengthening/mobility  - Encourage toileting schedule  Outcome: Adequate for Discharge

## 2024-10-26 NOTE — H&P
[facility]  part of [facility]    History & Physical    Art Kazt Patient Status:  Emergency    1955 MRN [id number]   Location [facility] EMERGENCY DEPARTMENT Attending Eugene Hope MD   Hosp Day # 0 PCP Mello Xie MD     Date:  10/25/2024  Date of Admission:  10/25/2024    Chief Complaint:  Chief Complaint   Patient presents with    Groin Pain       History of Present Illness:  Art Katz is a(n) 69 year old male, with past medical history significant for morbid obesity and a testicular cyst removed in the past presents with complaint of right-sided groin pain that started rather abruptly over the last 2 days.  Describes the onset as sudden with progressive worsening with no clear aggravating or relieving factors.  Claims pain comes is intermittent in nature denies any associated dysuria or hematuria.  Does admit to some right-sided back pain no fever or chills.  CT scan done in ER shows evidence of an 8 mm obstructing calculus in the right ureter    History:  Past Medical History:    Calculus of kidney    COVID    Kidney stone    Obesity     Past Surgical History:   Procedure Laterality Date    Gastric bypass,obese<100cm bill-en-y       Family History   Problem Relation Age of Onset    Stroke Father     Cancer Mother     Dementia Mother     Dementia Maternal Grandfather     Depression Paternal Grandmother     Other (Other) Brother         Brain tumor    Other (other ) Brother         seizure      reports that he quit smoking about 24 years ago. His smoking use included cigarettes. He has never been exposed to tobacco smoke. He has never used smokeless tobacco. He reports that he does not currently use alcohol. He reports that he does not currently use drugs.    Allergies:  Allergies[1]    Home Medications:  Prior to Admission Medications   Prescriptions Last Dose Informant Patient Reported? Taking?   Multiple Vitamin (ONE-DAILY MULTI VITAMINS) Oral Tab  10/25/2024 at  9:00 AM  Yes Yes   Sig: Take 1 tablet by mouth daily.   cetirizine 10 MG Oral Tab 10/25/2024 at  9:00 AM  Yes Yes   Sig: Take 1 tablet (10 mg total) by mouth daily.   omega-3 fatty acids 1000 MG Oral Cap 10/25/2024 at  9:00 AM  Yes Yes   Sig: Take 1,000 mg by mouth daily.   vitamin E 1000 UNITS Oral Cap 10/25/2024 at  9:00 AM  Yes Yes   Sig: Take 1 capsule (1,000 Units total) by mouth daily.      Facility-Administered Medications Last Administration Doses Remaining   cyanocobalamin (Vitamin B12) 1000 MCG/ML injection 1,000 mcg 3/7/2024 11:22 AM 7          Review of Systems:  Constitutional:  Weakness, Fatigue.  Eye:  Negative.  Ear/Nose/Mouth/Throat:  Negative.  Respiratory:  Negative  Cardiovascular: Negative  Gastrointestinal:  Negative.  Genitourinary: Right groin pain and flank pain  Endocrine:  Negative.  Immunologic:  Negative.  Musculoskeletal:  Negative.  Integumentary:  Negative.  Neurologic:  Negative.  Psychiatric:  Negative.  ROS reviewed as documented in chart    Physical Exam:  Temp:  [97.9 °F (36.6 °C)] 97.9 °F (36.6 °C)  Pulse:  [58-65] 61  Resp:  [24] 24  BP: (144-161)/(78-98) 152/98  SpO2:  [94 %-98 %] 94 %    General:  Alert and oriented.  Diffuse skin problem:  None.  Eye:  Pupils are equal, round and reactive to light, extraocular movements are intact, Normal conjunctiva.  HENT:  Normocephalic, oral mucosa is moist.  Head:  Normocephalic, atraumatic.  Neck:  Supple, non-tender, no carotid bruit, no jugular venous distention, no lymphadenopathy, no thyromegaly.  Respiratory:  Lungs are clear to auscultation, respirations are non-labored, breath sounds are equal, symmetrical chest wall expansion.  Cardiovascular:  Normal rate, regular rhythm, no murmur, no edema.  Gastrointestinal:  Soft, non-tender, non-distended, normal bowel sounds, no organomegaly.  Lymphatics:  No lymphadenopathy neck, axilla, groin.  Musculoskeletal: Normal range of motion.  normal strength.  Feet:  Normal  pulses.  Neurologic:  Alert, oriented, no focal deficits, cranial nerves II-XII are grossly intact.  Cognition and Speech:  Oriented, speech clear and coherent.  Psychiatric:  Cooperative, appropriate mood & affect.      Laboratory Data:   Lab Results   Component Value Date    WBC 8.0 10/25/2024    HGB 13.3 10/25/2024    HCT 39.1 10/25/2024    .0 10/25/2024    CREATSERUM 1.18 10/25/2024    BUN 16 10/25/2024     10/25/2024    K 4.0 10/25/2024     10/25/2024    CO2 25.0 10/25/2024     10/25/2024    CA 9.2 10/25/2024       Imaging:  CT ABDOMEN+PELVIS(CPT=74176)    Result Date: 10/25/2024  CONCLUSION:   8 mm obstructing calculus within the mid right ureter with moderate right hydroureteronephrosis.  Significant right perinephric stranding raises the possibility of superimposed infection.   Nonobstructing left caliceal calculus.  Multiple other incidental findings as described in the body of the report.     Dictated by (CST): Delroy Blas MD on 10/25/2024 at 8:07 PM     Finalized by (CST): Delroy Blas MD on 10/25/2024 at 8:12 PM          US SCROTUM W/ DOPPLER (CPT=93975/31905)    Result Date: 10/25/2024  CONCLUSION:  1. Unremarkable sonographic assessment of the testicles.  No evidence of testicular torsion.  No suspicious testicular mass.  No evidence of epididymo-orchitis. 2. There is a 1.2 cm right epididymal head cyst, which is likely incidental.  Please note that the left epididymis is not well seen, but does not appear enlarged or hypervascular. 3. Small left varicocele and trace left hydrocele. 4. Lesser incidental findings as above.   Dictated by (CST): Rupesh Griffin MD on 10/25/2024 at 7:06 PM     Finalized by (CST): Rupesh Griffin MD on 10/25/2024 at 7:09 PM            Assessment and Plan:    Right mid ureteric calculus with associated moderate hydronephrosis  Patient started on morphine and Toradol as needed for pain, IV fluids initiated.  Will start Flomax as well.  Urology has  been consulted, will remain n.p.o. for now.    Uncontrolled hypertension  Likely pain related, optimize pain management, will use IV hydralazine for uncontrolled pressures.    Morbid obesity with likely obstructive sleep apnea  BMI 50.  Patient counseled regarding diet and exercise once current issues resolve, consider CPAP at night.    Prophylaxis  Subcutaneous heparin    CODE STATUS  Full    Primary care physician  Mello Xie MD    60 minutes spent on this admission - examining patient, obtaining history, reviewing previous medical records, going over test results/imaging and discussing plan of care. All questions answered.     Disposition  Clinical course will dictate outcome      SAGE PARADA MD  10/25/2024  9:35 PM         [1] No Known Allergies

## 2024-10-26 NOTE — DISCHARGE SUMMARY
Discharge Summary     Art Katz Patient Status:  Observation    1955 MRN [id number]   Location [facility] 5SW/SE Attending Adriane Thomas MD   Hosp Day # 0 PCP Mello Xie MD     Date of Admission: 10/25/2024  Date of Discharge: 10/26/2024  Discharge Disposition: Home or Self Care    Discharge Diagnosis: Right obstructive uropathy   S/p Cystoscopy, RIGHT retrograde pyelogram and ureteral stent placement      History of Present Illness:             Brief Synopsis:   Right obstructive uropathy   S/p Cystoscopy, RIGHT retrograde pyelogram and ureteral stent placement  Did well post procedurally was discharged in stable condition to home with close follow-up with urology  Abx x3 days   Will arrange definitive stone surgery in a few weeks     Lace+ Score: 34  59-90 High Risk  29-58 Medium Risk  0-28   Low Risk       TCM Follow-Up Recommendation:  LACE < 29: Low Risk of readmission after discharge from the hospital. No TCM follow-up needed.    Procedures during hospitalization:    Cystoscopy, RIGHT retrograde pyelogram and ureteral stent placement    Consultants:  Urology    Discharge Medication List:     Discharge Medications        START taking these medications        Instructions Prescription details   HYDROcodone-acetaminophen 5-325 MG Tabs  Commonly known as: Norco      Take 1 tablet by mouth every 4 (four) hours as needed for Pain.   Quantity: 10 tablet  Refills: 0     oxybutynin 5 MG Tabs  Commonly known as: Ditropan      Take 1 tablet (5 mg total) by mouth 3 (three) times daily as needed (Bladder spasms). Stop 12 hours before Melendez catheter removal in clinic (if applicable)   Quantity: 15 tablet  Refills: 0     phenazopyridine 100 MG Tabs  Commonly known as: Pyridium      Take 1 tablet (100 mg total) by mouth 3 (three) times daily as needed for Pain. This will turn your urine orange.   Quantity: 10 tablet  Refills: 0     Polyethylene Glycol 3350 17 g Pack  Commonly known as:  MiraLax      Take 17 g by mouth daily as needed (Take to avoid constipation, especially if taking narcotic pain medications.).   Quantity: 20 packet  Refills: 1     sulfamethoxazole-trimethoprim -160 MG Tabs per tablet  Commonly known as: Bactrim DS      Take 1 tablet by mouth 2 (two) times daily for 3 days.   Stop taking on: October 29, 2024  Quantity: 6 tablet  Refills: 0     tamsulosin 0.4 MG Caps  Commonly known as: Flomax      Take 1 capsule (0.4 mg total) by mouth every evening for 14 days.   Stop taking on: November 9, 2024  Quantity: 14 capsule  Refills: 0            CONTINUE taking these medications        Instructions Prescription details   cetirizine 10 MG Tabs  Commonly known as: ZyrTEC      Take 1 tablet (10 mg total) by mouth daily.   Refills: 0     omega-3 fatty acids 1000 MG Caps  Commonly known as: Fish Oil      Take 1,000 mg by mouth daily.   Refills: 0     One-Daily Multi Vitamins Tabs      Take 1 tablet by mouth daily.   Refills: 0     vitamin E 1000 UNITS Caps  Commonly known as: Alpha-E      Take 1 capsule (1,000 Units total) by mouth daily.   Refills: 0               Where to Get Your Medications        These medications were sent to [id number] - [place], [place] - [address] AT [facility], [phone], [phone]  [address] [place] [id number]      Phone: [phone]   HYDROcodone-acetaminophen 5-325 MG Tabs  oxybutynin 5 MG Tabs  phenazopyridine 100 MG Tabs  Polyethylene Glycol 3350 17 g Pack  sulfamethoxazole-trimethoprim -160 MG Tabs per tablet  tamsulosin 0.4 MG Caps         Follow-up appointment:   No follow-up provider specified.  Appointments for Next 30 Days 10/26/2024 - 11/25/2024      None            Supplementary Documentation:   [place] reviewed[place]    Vital signs:  Temp:  [97.9 °F (36.6 °C)-98.5 °F (36.9 °C)] 98.5 °F (36.9 °C)  Pulse:  [58-81] 70  Resp:  [12-24] 14  BP: (108-161)/(47-98) 112/68  SpO2:  [94 %-98 %] 98 %    Physical  Exam:    General:  NAD  Cardiovascular:  S1, S2    -----------------------------------------------------------------------------------------------  PATIENT DISCHARGE INSTRUCTIONS: See electronic chart    Tip: Documentation requirements: For split shared discharge, BOTH providers need to document specific floor, unit, and time spent on the discharge.  The note needs to be signed by the provider with > 50% of time and bill under their NPI.   Time spent:  40 min         Adriane Thomas MD

## 2024-10-26 NOTE — CONSULTS
Urology Inpatient Consult Note  [facility]  part of [facility]      Art Katz Patient Status:  Observation    1955 MRN [id number]   Location [facility] 5SW/SE Attending Adriane Thomas MD   Hosp Day # 0 PCP Mello Xie MD     Date of Admission:  10/25/2024  Date of Consult: 10/26/2024  Primary Care Provider: Mello Xie MD     Consulting Provider: Dr. Thomas    Reason for Consultation:   Right ureteral stone     History of Present Illness:       Art Katz is a 69 year old male with history of obesity presenting with right flank and groin pain; found to have right mid ureteral 8mm obstructing stone with hydronephrosis.    Patient has urologic history notable for kidney stone 40 years ago.  Recalls passing a calcium oxalate stone.  More recently, he had a right hydrocelectomy in  at [place].  He now presents to the emergency room with right-sided flank pain and groin pain for 2 days.  This was sudden onset.  Workup in the emergency room showed normal labs.  UA without infection.  CT scan with 8 mm right proximal ureteral stone with hydronephrosis, additional 9 mm nonobstructing left-sided stone.  Ultrasound of the scrotum was reassuring with small epididymal head cyst, left varicocele.  He was admitted due to ongoing pain.  Urology asked to see patient this morning.  Patient has ongoing right-sided flank pain.  Groin pain has improved.  He has no systemic symptoms at this time.  Requiring narcotic pain medication for control of pain.  He denies any gross hematuria or baseline urinary issues.      History:     Past Medical History:    Calculus of kidney    COVID    Kidney stone    Obesity       Past Surgical History:   Procedure Laterality Date    Gastric bypass,obese<100cm bill-en-y         Family History   Problem Relation Age of Onset    Stroke Father     Cancer Mother     Dementia Mother     Dementia Maternal Grandfather     Depression Paternal  Grandmother     Other (Other) Brother         Brain tumor    Other (other ) Brother         seizure       Social History     Socioeconomic History    Marital status:    Tobacco Use    Smoking status: Former     Current packs/day: 0.00     Types: Cigarettes     Quit date: 2000     Years since quittin.2     Passive exposure: Never    Smokeless tobacco: Never   Vaping Use    Vaping status: Never Used   Substance and Sexual Activity    Alcohol use: Not Currently    Drug use: Not Currently     Social Drivers of Health     Food Insecurity: No Food Insecurity (10/25/2024)    Food Insecurity     Food Insecurity: Never true   Transportation Needs: No Transportation Needs (10/25/2024)    Transportation Needs     Lack of Transportation: No   Housing Stability: Low Risk  (10/25/2024)    Housing Stability     Housing Instability: No       Medications:  No current outpatient medications on file.       Allergies:  Allergies[1]    Review of Systems:   A comprehensive 10-point review of systems was completed.  Pertinent positives and negatives are noted in the the HPI.    Physical Exam:   Vital Signs:  Blood pressure 121/72, pulse 67, temperature 98 °F (36.7 °C), temperature source Oral, resp. rate 18, weight (!) 325 lb 8 oz (147.6 kg), SpO2 94%.     CONSTITUTIONAL: Well developed, well nourished, in no acute distress  NEUROLOGIC: Alert and oriented  HEAD: Normocephalic, atraumatic  SKIN: No evident rashes  ABDOMEN: Soft, non-tender, non-distended, mildly tender right lower quadrant    Laboratory Data:  Lab Results   Component Value Date    WBC 8.0 10/25/2024    HGB 13.3 10/25/2024    .0 10/25/2024     Lab Results   Component Value Date     10/25/2024    K 4.0 10/25/2024     10/25/2024    CO2 25.0 10/25/2024    BUN 16 10/25/2024     (H) 10/25/2024    GFRAA 111 2020    AST 22 2024    ALT 23 2024    TP 8.6 (H) 2024    ALB 4.5 2024    CA 9.2 10/25/2024        Urinalysis Results (last three years):  Recent Labs     08/31/23  0759 09/06/24  1441 10/25/24  2016   COLORUR Yellow Yellow Yellow   CLARITY Clear Clear Clear   SPECGRAVITY >=1.030 1.027 >1.030*   PHURINE 5.0 6.0 5.5   PROUR Negative Trace* 30*   GLUUR Negative Normal Normal   KETUR Negative Negative Negative   BILUR Negative Negative Negative   BLOODURINE Negative Negative 2+*   NITRITE Negative Negative Negative   UROBILINOGEN 0.2 Normal 2*   LEUUR Negative Negative Negative   WBCUR  --   --  1-5   RBCUR  --   --  >10*   BACUR  --   --  None Seen       Urine Culture Results (last three years):  No results found for: \"URINECUL\"    PSA:  Lab Results   Component Value Date    PSAS 1.12 08/31/2023    PSAS 0.82 08/25/2020        Imaging (last three days):  CT ABDOMEN+PELVIS(CPT=74176)    Result Date: 10/25/2024  CONCLUSION:   8 mm obstructing calculus within the mid right ureter with moderate right hydroureteronephrosis.  Significant right perinephric stranding raises the possibility of superimposed infection.   Nonobstructing left caliceal calculus.  Multiple other incidental findings as described in the body of the report.     Dictated by (CST): Delroy Blas MD on 10/25/2024 at 8:07 PM     Finalized by (CST): Delroy Blas MD on 10/25/2024 at 8:12 PM          US SCROTUM W/ DOPPLER (CPT=93975/91705)    Result Date: 10/25/2024  CONCLUSION:  1. Unremarkable sonographic assessment of the testicles.  No evidence of testicular torsion.  No suspicious testicular mass.  No evidence of epididymo-orchitis. 2. There is a 1.2 cm right epididymal head cyst, which is likely incidental.  Please note that the left epididymis is not well seen, but does not appear enlarged or hypervascular. 3. Small left varicocele and trace left hydrocele. 4. Lesser incidental findings as above.   Dictated by (CST): Rupesh Griffin MD on 10/25/2024 at 7:06 PM     Finalized by (CST): Rupesh Griffin MD on 10/25/2024 at 7:09 PM               Assessment/Plan:    Art Katz is a 69 year old male with history of obesity presenting with right flank and groin pain; found to have right mid ureteral 8mm obstructing stone with hydronephrosis.    Discussed patient's imaging findings in detail.  Discussed options for medical expulsive therapy versus ureteral stent placement for management of his obstructing stone.  We discussed that given the level of inflammation, large stone, position of stone will likely plan for stent placement now and interval ureteroscopy in the future which he understands.  He would like to proceed with stent placement given his pain.  We discussed the risks of the procedure which include but are not limited to bleeding, infection, damage to organs, inability to place stent which would require nephrostomy tube.  He understands the need for further surgery to remove the stone.  Discussed potential for Melendez catheter post op if bleeding is noted.  We also discussed that he will likely need interval left-sided surgery for his large stone.    Will obtain consent, patient marked  NPO   Rocephin  Proceed to OR for cysto, right ureteral stent placement   Possible DC after procedure      I have personally reviewed all relevant medical records, labs, and imaging.    Thank you for this consult. Please call if there are any questions or concerns.           Bert Norton MD  Staff Urologist  [facility]  Office: [phone]         [1]   Allergies  Allergen Reactions    Seasonal OTHER (SEE COMMENTS)     Runny nose

## 2024-10-26 NOTE — TELEPHONE ENCOUNTER
Hi,    Can you please schedule this patient for surgery.     Also, can you arrange for the patient to drop a urine sample for urine culture 1-2 weeks prior to the scheduled surgery date? I placed the order.       Thanks,  Bert     Urology Surgery Request  Surgeon: Bert Norton  Location (if known): [facility]  Procedure: Cystoscopy with right ureteroscopy, holmium laser lithotripsy, stent exchange    Anesthesia: General   Time Frame: Next available- within 2-4 weeks   Time required: 60 minutes  Diagnosis: Kidney stone   Special Equipment: C-arm,    Antibiotics: per hospital protocol unless checked below   ___ Levaquin 500 mg IV   ___ Gemcitabine 2 g/100 mL NS bladder instillation to be given in OR    Estimated Post Op/Follow Up Appt:   1 week for stent removal. Please schedule appointment at time of surgery scheduling.

## 2024-10-26 NOTE — PROGRESS NOTES
Progress Note     Art Katz Patient Status:  Observation    1955 MRN [id number]   Location [facility] POST ANESTHESIA CARE UNIT Attending Adriane Thomas MD   Hosp Day # 0 PCP Mello Xie MD     Chief Complaint: Right obstructive uropathy    Subjective:   S: Patient in OR for removal of 8 mm stone.      Objective:   Vital signs:  Temp:  [97.9 °F (36.6 °C)-98.5 °F (36.9 °C)] 98.5 °F (36.9 °C)  Pulse:  [58-81] 65  Resp:  [12-24] 12  BP: (108-161)/(47-98) 108/67  SpO2:  [94 %-98 %] 96 %    Wt Readings from Last 6 Encounters:   10/25/24 (!) 325 lb 8 oz (147.6 kg)   24 (!) 323 lb (146.5 kg)   24 (!) 322 lb 12.8 oz (146.4 kg)   23 (!) 319 lb (144.7 kg)   23 (!) 320 lb (145.2 kg)   23 (!) 320 lb (145.2 kg)         Physical Exam:    Deferred in OR    Results:   Diagnostic Data:      Labs:    Labs Last 24 Hours:   BMP     CBC    Other     Na 140 Cl 107 BUN 16 Glu 131   Hb 13.3   PTT - Procal -   K 4.0 CO2 25.0 Cr 1.18   WBC 8.0 >< .0  INR - CRP -   Renal Lytes Endo    Hct 39.1   Trop - D dim -   eGFR - Ca 9.2 POC Gluc  -    LFT   pBNP - Lactic -   eGFR AA - PO4 - A1c -   AST - APk - Prot -  LDL -     Mg - TSH -   ALT - T mirta - Alb -        COVID-19 Lab Results    COVID-19  Lab Results   Component Value Date    COVID19 Not Detected 2024       Pro-Calcitonin  No results for input(s): \"PCT\" in the last 168 hours.    Cardiac  No results for input(s): \"TROP\", \"PBNP\" in the last 168 hours.    Creatinine Kinase  No results for input(s): \"CK\" in the last 168 hours.    Inflammatory Markers  No results for input(s): \"CRP\", \"NESHA\", \"LDH\", \"DDIMER\" in the last 168 hours.    Imaging: Imaging data reviewed in Epic.    Medications:    cefTRIAXone  2 g Intravenous Q24H    [Transfer Hold] famotidine  20 mg Intravenous BID       Assessment & Plan:   ASSESSMENT / PLAN:     Right mid ureteric calculus with associated moderate hydronephrosis  Patient started on morphine and  Toradol as needed for pain, IV fluids initiated.    -started Flomax as well.    Urology has been consulted: Patient in OR for removal of 8 mm obstructing stone     Uncontrolled hypertension  Likely pain related, optimize pain management, will use IV hydralazine for uncontrolled pressures.     Morbid obesity with likely obstructive sleep apnea  BMI 50.  Patient counseled regarding diet and exercise once current issues resolve, consider CPAP at night.     Prophylaxis  Subcutaneous heparin     CODE STATUS  Full    Coordinated care with providers and counseling re: treatment plan and workup     Adraine Thomas MD    Supplementary Documentation:

## 2024-10-26 NOTE — PLAN OF CARE
Problem: Patient Centered Care  Goal: Patient preferences are identified and integrated in the patient's plan of care  Description: Interventions:  - What would you like us to know as we care for you? \"I am from home with my family.\"  - Provide timely, complete, and accurate information to patient/family  - Incorporate patient and family knowledge, values, beliefs, and cultural backgrounds into the planning and delivery of care  - Encourage patient/family to participate in care and decision-making at the level they choose  - Honor patient and family perspectives and choices  Outcome: Progressing     Problem: Patient/Family Goals  Goal: Patient/Family Long Term Goal  Description: Patient's Long Term Goal: \"to go home\"    Interventions:  -Follow up MD appt  -Take meds as prescribed  -Use of assistive device if needed  - See additional Care Plan goals for specific interventions  Outcome: Progressing  Goal: Patient/Family Short Term Goal  Description: Patient's Short Term Goal: \"to prevent pain and infection\"    Interventions:   -Monitor VS  -Check labs results  -Administer IVF as ordered  -Provide pain meds as prescribed  - See additional Care Plan goals for specific interventions  Outcome: Progressing     Problem: GENITOURINARY - ADULT  Goal: Absence of urinary retention  Description: INTERVENTIONS:  - Assess patient’s ability to void and empty bladder  - Monitor intake/output and perform bladder scan as needed  - Follow urinary retention protocol/standard of care  - Consider collaborating with pharmacy to review patient's medication profile  - Implement strategies to promote bladder emptying  Outcome: Progressing     Problem: PAIN - ADULT  Goal: Verbalizes/displays adequate comfort level or patient's stated pain goal  Description: INTERVENTIONS:  - Encourage pt to monitor pain and request assistance  - Assess pain using appropriate pain scale  - Administer analgesics based on type and severity of pain and evaluate  response  - Implement non-pharmacological measures as appropriate and evaluate response  - Consider cultural and social influences on pain and pain management  - Manage/alleviate anxiety  - Utilize distraction and/or relaxation techniques  - Monitor for opioid side effects  - Notify MD/LIP if interventions unsuccessful or patient reports new pain  - Anticipate increased pain with activity and pre-medicate as appropriate  Outcome: Progressing     Problem: RISK FOR INFECTION - ADULT  Goal: Absence of fever/infection during anticipated neutropenic period  Description: INTERVENTIONS  - Monitor WBC  - Administer growth factors as ordered  - Implement neutropenic guidelines  Outcome: Progressing     Problem: SAFETY ADULT - FALL  Goal: Free from fall injury  Description: INTERVENTIONS:  - Assess pt frequently for physical needs  - Identify cognitive and physical deficits and behaviors that affect risk of falls.  - Effort fall precautions as indicated by assessment.  - Educate pt/family on patient safety including physical limitations  - Instruct pt to call for assistance with activity based on assessment  - Modify environment to reduce risk of injury  - Provide assistive devices as appropriate  - Consider OT/PT consult to assist with strengthening/mobility  - Encourage toileting schedule  Outcome: Progressing     Patient is alert and oriented x 4. He reported mid back pain and scrotal pain, pain meds given as needed. Safety and fall precautions initiated. Call light within reach. Frequent rounding by nursing staff.

## 2024-10-28 NOTE — TELEPHONE ENCOUNTER
Received a call back, Scheduling the surgery for 11/26/24.   Reviewed the pre-op instructions and will send via My Chart once confirmed.  Patient can contact me at [phone]

## 2024-11-12 ENCOUNTER — LABORATORY ENCOUNTER (OUTPATIENT)
Dept: LAB | Age: 69
End: 2024-11-12
Attending: UROLOGY
Payer: MEDICARE

## 2024-11-12 DIAGNOSIS — N20.0 KIDNEY STONE: ICD-10-CM

## 2024-11-12 PROCEDURE — 87086 URINE CULTURE/COLONY COUNT: CPT

## 2024-11-22 NOTE — H&P
UROLOGY PRE-OPERATIVE HISTORY & PHYSICAL      Art Katz Patient Status:  [facility]    1955 MRN [id number]   Location [facility] SURGERY Attending Bert Norton MD   Hosp Day # 0 PCP Mello Xie MD     Primary Care Provider: Mello Xie MD     Procedure      CYSTOSCOPY, RIGHT URETEROSCOPY, LASER LITHOTRIPSY, RIGHT STENT EXCHANGE.:     History of Present Illness:      Art Katz is a 69 year old male with history of obesity presenting for management of right ureteral stone.      Patient has urologic history notable for kidney stone 40 years ago.  Recalls passing a calcium oxalate stone.  More recently, he had a right hydrocelectomy in  at [place].  He then presented to the ED on 10/26 with right-sided flank pain and groin pain for 2 days.  This was sudden onset.  Workup in the emergency room showed normal labs.  UA without infection.  CT scan with 8 mm right proximal ureteral stone with hydronephrosis, additional 9 mm nonobstructing left-sided stone.  Ultrasound of the scrotum was reassuring with small epididymal head cyst, left varicocele.  He was admitted due to ongoing pain.    Taken to OR on 10/26 ;   Normal urethra; mildly enlarged prostate. No bladder lesions; mildly trabeculated. Right RPG with moderate hydronephrosis. Right ureteral stent (6x28 JJ) placed without issue. No significant purulence on cannulation.    Here now for definitive URS for right sided stone.   The risks discussed included, but were not limited to, urinary tract infection, sepsis, bleeding, injury to urethra/bladder/ureter, urethra stricture, ureteral stricture, inability to treat stone. Patient agreeable to proceed and informed consent obtained.    Large contralateral stone - will discuss at follow up   Ucx: Negative     Blood thinner None  Stent size  6 x 28   5' 8\" (1.727 m)    History:     Past Medical History:    Back problem    Calculus of kidney    COVID    Hearing impaired  person, bilateral    HEARING AIDS    Kidney stone    Obesity    Sleep apnea    NO TX    Visual impairment    GLASSES       Past Surgical History:   Procedure Laterality Date    Cyst removal  2019    CYST REMOVED FROM TESTICLE    Gastric bypass,obese<100cm bill-en-y  2010       Family History   Problem Relation Age of Onset    Stroke Father     Cancer Mother     Dementia Mother     Dementia Maternal Grandfather     Depression Paternal Grandmother     Other (Other) Brother         Brain tumor    Other (other ) Brother         seizure       Social History     Socioeconomic History    Marital status:    Tobacco Use    Smoking status: Former     Current packs/day: 0.00     Types: Cigarettes     Quit date: 2000     Years since quittin.3     Passive exposure: Never    Smokeless tobacco: Never   Vaping Use    Vaping status: Never Used   Substance and Sexual Activity    Alcohol use: Not Currently    Drug use: Not Currently     Social Drivers of Health     Food Insecurity: No Food Insecurity (10/25/2024)    Food Insecurity     Food Insecurity: Never true   Transportation Needs: No Transportation Needs (10/25/2024)    Transportation Needs     Lack of Transportation: No   Housing Stability: Low Risk  (10/25/2024)    Housing Stability     Housing Instability: No       Medications:  Current Outpatient Medications   Medication Sig Dispense Refill    Polyethylene Glycol 3350 (MIRALAX) 17 g Oral Powd Pack Take 17 g by mouth daily as needed (Take to avoid constipation, especially if taking narcotic pain medications.). 20 packet 1    omega-3 fatty acids 1000 MG Oral Cap Take 1,000 mg by mouth daily.      cetirizine 10 MG Oral Tab Take 1 tablet (10 mg total) by mouth daily.      Multiple Vitamin (ONE-DAILY MULTI VITAMINS) Oral Tab Take 1 tablet by mouth daily.      vitamin E 1000 UNITS Oral Cap Take 1 capsule (1,000 Units total) by mouth daily.         Allergies:  Allergies[1]    Review of Systems:   A comprehensive  10-point review of systems was completed.  Pertinent positives and negatives are noted in the the HPI.    Physical Exam:   Vital Signs:  Height 5' 8\" (1.727 m), weight (!) 325 lb (147.4 kg).     CONSTITUTIONAL: Well developed, well nourished, in no acute distress   RESPIRATORY: Normal respiratory effort  ABDOMEN: Soft, non-tender, non-distended      Laboratory Data:  Lab Results   Component Value Date    WBC 8.0 10/25/2024    HGB 13.3 10/25/2024    .0 10/25/2024     Lab Results   Component Value Date     10/25/2024    K 4.0 10/25/2024     10/25/2024    CO2 25.0 10/25/2024    BUN 16 10/25/2024     (H) 10/25/2024    GFRAA 111 08/25/2020    AST 22 09/06/2024    ALT 23 09/06/2024    TP 8.6 (H) 09/06/2024    ALB 4.5 09/06/2024    CA 9.2 10/25/2024       Urinalysis Results (last three years):  Recent Labs     08/31/23  0759 09/06/24  1441 10/25/24  2016   COLORUR Yellow Yellow Yellow   CLARITY Clear Clear Clear   SPECGRAVITY >=1.030 1.027 >1.030*   PHURINE 5.0 6.0 5.5   PROUR Negative Trace* 30*   GLUUR Negative Normal Normal   KETUR Negative Negative Negative   BILUR Negative Negative Negative   BLOODURINE Negative Negative 2+*   NITRITE Negative Negative Negative   UROBILINOGEN 0.2 Normal 2*   LEUUR Negative Negative Negative   WBCUR  --   --  1-5   RBCUR  --   --  >10*   BACUR  --   --  None Seen       Urine Culture Results (last three years):  Lab Results   Component Value Date    URINECUL No Growth 2 Days 11/12/2024       PSA:  Lab Results   Component Value Date    PSAS 1.12 08/31/2023    PSAS 0.82 08/25/2020        Imaging (last three days):  No results found.     Assessment:   Art Katz is a 69 year old y/o male who presents for the above stated procedure.       Plan:     - OR for CYSTOSCOPY, RIGHT URETEROSCOPY, LASER LITHOTRIPSY, RIGHT STENT EXCHANGE.:   - NPO since midnight   - Antibiotics ordered for OR   - Informed consent obtained - risks and benefits explained, and all  questions answered  - Marked appropriately     I have personally reviewed all relevant medical records, labs, and imaging.       Bert Norton MD  Staff Urologist  [facility]  Office: [phone]         [1]   Allergies  Allergen Reactions    Seasonal OTHER (SEE COMMENTS)     Runny nose

## 2024-11-26 ENCOUNTER — ANESTHESIA EVENT (OUTPATIENT)
Dept: SURGERY | Facility: HOSPITAL | Age: 69
End: 2024-11-26
Payer: MEDICARE

## 2024-11-26 ENCOUNTER — HOSPITAL ENCOUNTER (OUTPATIENT)
Facility: HOSPITAL | Age: 69
Setting detail: HOSPITAL OUTPATIENT SURGERY
Discharge: HOME | End: 2024-11-26
Attending: UROLOGY | Admitting: UROLOGY
Payer: MEDICARE

## 2024-11-26 ENCOUNTER — APPOINTMENT (OUTPATIENT)
Dept: GENERAL RADIOLOGY | Facility: HOSPITAL | Age: 69
End: 2024-11-26
Attending: UROLOGY
Payer: MEDICARE

## 2024-11-26 ENCOUNTER — ANESTHESIA (OUTPATIENT)
Dept: SURGERY | Facility: HOSPITAL | Age: 69
End: 2024-11-26
Payer: MEDICARE

## 2024-11-26 VITALS
OXYGEN SATURATION: 95 % | WEIGHT: 315 LBS | SYSTOLIC BLOOD PRESSURE: 141 MMHG | HEIGHT: 68 IN | HEART RATE: 70 BPM | DIASTOLIC BLOOD PRESSURE: 84 MMHG | RESPIRATION RATE: 18 BRPM | BODY MASS INDEX: 47.74 KG/M2 | TEMPERATURE: 97 F

## 2024-11-26 DIAGNOSIS — N20.0 KIDNEY STONE: ICD-10-CM

## 2024-11-26 PROCEDURE — 0TC08ZZ EXTIRPATION OF MATTER FROM RIGHT KIDNEY, VIA NATURAL OR ARTIFICIAL OPENING ENDOSCOPIC: ICD-10-PCS | Performed by: UROLOGY

## 2024-11-26 PROCEDURE — 74420 UROGRAPHY RTRGR +-KUB: CPT | Performed by: UROLOGY

## 2024-11-26 PROCEDURE — 52356 CYSTO/URETERO W/LITHOTRIPSY: CPT | Performed by: UROLOGY

## 2024-11-26 PROCEDURE — 0T768DZ DILATION OF RIGHT URETER WITH INTRALUMINAL DEVICE, VIA NATURAL OR ARTIFICIAL OPENING ENDOSCOPIC: ICD-10-PCS | Performed by: UROLOGY

## 2024-11-26 PROCEDURE — BT1D1ZZ FLUOROSCOPY OF RIGHT KIDNEY, URETER AND BLADDER USING LOW OSMOLAR CONTRAST: ICD-10-PCS | Performed by: UROLOGY

## 2024-11-26 DEVICE — URETERAL STENT
Type: IMPLANTABLE DEVICE | Site: URETER | Status: FUNCTIONAL
Brand: ASCERTA™

## 2024-11-26 RX ORDER — ROCURONIUM BROMIDE 10 MG/ML
INJECTION, SOLUTION INTRAVENOUS AS NEEDED
Status: DISCONTINUED | OUTPATIENT
Start: 2024-11-26 | End: 2024-11-26 | Stop reason: SURG

## 2024-11-26 RX ORDER — ACETAMINOPHEN 500 MG
1000 TABLET ORAL ONCE AS NEEDED
Status: COMPLETED | OUTPATIENT
Start: 2024-11-26 | End: 2024-11-26

## 2024-11-26 RX ORDER — HYDROMORPHONE HYDROCHLORIDE 1 MG/ML
0.4 INJECTION, SOLUTION INTRAMUSCULAR; INTRAVENOUS; SUBCUTANEOUS EVERY 5 MIN PRN
Status: DISCONTINUED | OUTPATIENT
Start: 2024-11-26 | End: 2024-11-26

## 2024-11-26 RX ORDER — HYDROCODONE BITARTRATE AND ACETAMINOPHEN 5; 325 MG/1; MG/1
2 TABLET ORAL ONCE AS NEEDED
Status: COMPLETED | OUTPATIENT
Start: 2024-11-26 | End: 2024-11-26

## 2024-11-26 RX ORDER — NALOXONE HYDROCHLORIDE 0.4 MG/ML
0.08 INJECTION, SOLUTION INTRAMUSCULAR; INTRAVENOUS; SUBCUTANEOUS AS NEEDED
Status: DISCONTINUED | OUTPATIENT
Start: 2024-11-26 | End: 2024-11-26

## 2024-11-26 RX ORDER — ONDANSETRON 2 MG/ML
4 INJECTION INTRAMUSCULAR; INTRAVENOUS EVERY 6 HOURS PRN
Status: DISCONTINUED | OUTPATIENT
Start: 2024-11-26 | End: 2024-11-26

## 2024-11-26 RX ORDER — TAMSULOSIN HYDROCHLORIDE 0.4 MG/1
0.4 CAPSULE ORAL EVERY EVENING
Qty: 14 CAPSULE | Refills: 0 | Status: SHIPPED | OUTPATIENT
Start: 2024-11-26 | End: 2024-12-10

## 2024-11-26 RX ORDER — DEXAMETHASONE SODIUM PHOSPHATE 4 MG/ML
VIAL (ML) INJECTION AS NEEDED
Status: DISCONTINUED | OUTPATIENT
Start: 2024-11-26 | End: 2024-11-26 | Stop reason: SURG

## 2024-11-26 RX ORDER — METOCLOPRAMIDE HYDROCHLORIDE 5 MG/ML
10 INJECTION INTRAMUSCULAR; INTRAVENOUS EVERY 8 HOURS PRN
Status: DISCONTINUED | OUTPATIENT
Start: 2024-11-26 | End: 2024-11-26

## 2024-11-26 RX ORDER — LIDOCAINE HYDROCHLORIDE 10 MG/ML
INJECTION, SOLUTION EPIDURAL; INFILTRATION; INTRACAUDAL; PERINEURAL AS NEEDED
Status: DISCONTINUED | OUTPATIENT
Start: 2024-11-26 | End: 2024-11-26 | Stop reason: SURG

## 2024-11-26 RX ORDER — LIDOCAINE HYDROCHLORIDE 20 MG/ML
JELLY TOPICAL AS NEEDED
Status: DISCONTINUED | OUTPATIENT
Start: 2024-11-26 | End: 2024-11-26 | Stop reason: HOSPADM

## 2024-11-26 RX ORDER — SODIUM CHLORIDE, SODIUM LACTATE, POTASSIUM CHLORIDE, CALCIUM CHLORIDE 600; 310; 30; 20 MG/100ML; MG/100ML; MG/100ML; MG/100ML
INJECTION, SOLUTION INTRAVENOUS CONTINUOUS
Status: DISCONTINUED | OUTPATIENT
Start: 2024-11-26 | End: 2024-11-26

## 2024-11-26 RX ORDER — HYDROCODONE BITARTRATE AND ACETAMINOPHEN 5; 325 MG/1; MG/1
1 TABLET ORAL ONCE AS NEEDED
Status: COMPLETED | OUTPATIENT
Start: 2024-11-26 | End: 2024-11-26

## 2024-11-26 RX ORDER — SULFAMETHOXAZOLE AND TRIMETHOPRIM 800; 160 MG/1; MG/1
1 TABLET ORAL 2 TIMES DAILY
Qty: 6 TABLET | Refills: 0 | Status: SHIPPED | OUTPATIENT
Start: 2024-11-26 | End: 2024-11-29

## 2024-11-26 RX ORDER — PHENAZOPYRIDINE HYDROCHLORIDE 100 MG/1
100 TABLET, FILM COATED ORAL 3 TIMES DAILY PRN
Qty: 10 TABLET | Refills: 0 | Status: SHIPPED | OUTPATIENT
Start: 2024-11-26

## 2024-11-26 RX ORDER — HYDROMORPHONE HYDROCHLORIDE 1 MG/ML
0.2 INJECTION, SOLUTION INTRAMUSCULAR; INTRAVENOUS; SUBCUTANEOUS EVERY 5 MIN PRN
Status: DISCONTINUED | OUTPATIENT
Start: 2024-11-26 | End: 2024-11-26

## 2024-11-26 RX ORDER — PHENYLEPHRINE HCL 10 MG/ML
VIAL (ML) INJECTION AS NEEDED
Status: DISCONTINUED | OUTPATIENT
Start: 2024-11-26 | End: 2024-11-26 | Stop reason: SURG

## 2024-11-26 RX ORDER — ONDANSETRON 2 MG/ML
INJECTION INTRAMUSCULAR; INTRAVENOUS AS NEEDED
Status: DISCONTINUED | OUTPATIENT
Start: 2024-11-26 | End: 2024-11-26 | Stop reason: SURG

## 2024-11-26 RX ORDER — HYDROMORPHONE HYDROCHLORIDE 1 MG/ML
0.6 INJECTION, SOLUTION INTRAMUSCULAR; INTRAVENOUS; SUBCUTANEOUS EVERY 5 MIN PRN
Status: DISCONTINUED | OUTPATIENT
Start: 2024-11-26 | End: 2024-11-26

## 2024-11-26 RX ORDER — POLYETHYLENE GLYCOL 3350 17 G/17G
17 POWDER, FOR SOLUTION ORAL DAILY PRN
Qty: 20 PACKET | Refills: 1 | Status: SHIPPED | OUTPATIENT
Start: 2024-11-26

## 2024-11-26 RX ORDER — KETOROLAC TROMETHAMINE 30 MG/ML
INJECTION, SOLUTION INTRAMUSCULAR; INTRAVENOUS AS NEEDED
Status: DISCONTINUED | OUTPATIENT
Start: 2024-11-26 | End: 2024-11-26 | Stop reason: SURG

## 2024-11-26 RX ORDER — EPHEDRINE SULFATE 50 MG/ML
INJECTION INTRAVENOUS AS NEEDED
Status: DISCONTINUED | OUTPATIENT
Start: 2024-11-26 | End: 2024-11-26 | Stop reason: SURG

## 2024-11-26 RX ORDER — CEFAZOLIN SODIUM IN 0.9 % NACL 3 G/100 ML
3 INTRAVENOUS SOLUTION, PIGGYBACK (ML) INTRAVENOUS ONCE
Status: COMPLETED | OUTPATIENT
Start: 2024-11-26 | End: 2024-11-26

## 2024-11-26 RX ORDER — ACETAMINOPHEN 500 MG
1000 TABLET ORAL ONCE
Status: DISCONTINUED | OUTPATIENT
Start: 2024-11-26 | End: 2024-11-26 | Stop reason: HOSPADM

## 2024-11-26 RX ORDER — OXYBUTYNIN CHLORIDE 5 MG/1
5 TABLET ORAL 3 TIMES DAILY PRN
Qty: 15 TABLET | Refills: 0 | Status: SHIPPED | OUTPATIENT
Start: 2024-11-26

## 2024-11-26 RX ADMIN — SODIUM CHLORIDE, SODIUM LACTATE, POTASSIUM CHLORIDE, CALCIUM CHLORIDE: 600; 310; 30; 20 INJECTION, SOLUTION INTRAVENOUS at 08:49:00

## 2024-11-26 RX ADMIN — DEXAMETHASONE SODIUM PHOSPHATE 8 MG: 4 MG/ML VIAL (ML) INJECTION at 08:48:00

## 2024-11-26 RX ADMIN — ROCURONIUM BROMIDE 50 MG: 10 INJECTION, SOLUTION INTRAVENOUS at 08:40:00

## 2024-11-26 RX ADMIN — PHENYLEPHRINE HCL 50 MCG: 10 MG/ML VIAL (ML) INJECTION at 09:07:00

## 2024-11-26 RX ADMIN — EPHEDRINE SULFATE 5 MG: 50 INJECTION INTRAVENOUS at 09:13:00

## 2024-11-26 RX ADMIN — ONDANSETRON 4 MG: 2 INJECTION INTRAMUSCULAR; INTRAVENOUS at 09:20:00

## 2024-11-26 RX ADMIN — CEFAZOLIN SODIUM IN 0.9 % NACL 3 G: 3 G/100 ML INTRAVENOUS SOLUTION, PIGGYBACK (ML) INTRAVENOUS at 08:43:00

## 2024-11-26 RX ADMIN — KETOROLAC TROMETHAMINE 30 MG: 30 INJECTION, SOLUTION INTRAMUSCULAR; INTRAVENOUS at 09:20:00

## 2024-11-26 RX ADMIN — LIDOCAINE HYDROCHLORIDE 50 MG: 10 INJECTION, SOLUTION EPIDURAL; INFILTRATION; INTRACAUDAL; PERINEURAL at 08:40:00

## 2024-11-26 RX ADMIN — EPHEDRINE SULFATE 5 MG: 50 INJECTION INTRAVENOUS at 09:07:00

## 2024-11-26 NOTE — ANESTHESIA PREPROCEDURE EVALUATION
PRE-OP EVALUATION    Patient Name: Art Katz    Admit Diagnosis: Kidney stone [N20.0]    Pre-op Diagnosis: Kidney stone [N20.0]    CYSTOSCOPY, RIGHT URETEROSCOPY, LASER LITHOTRIPSY, RIGHT STENT EXCHANGE.    Anesthesia Procedure: CYSTOSCOPY, RIGHT URETEROSCOPY, LASER LITHOTRIPSY, RIGHT STENT EXCHANGE. (Right)    Surgeons and Role:     * Bert Norton MD - Primary    Pre-op vitals reviewed.        Body mass index is 49.42 kg/m².    Current medications reviewed.  Hospital Medications:  No current facility-administered medications on file as of 2024.       Outpatient Medications:   Prescriptions Prior to Admission[1]    Allergies: Seasonal      Anesthesia Evaluation    Patient summary reviewed.    Anesthetic Complications  (-) history of anesthetic complications         GI/Hepatic/Renal      (+) GERD                           Cardiovascular                (+) obesity                                       Endo/Other               (+) anemia                   Pulmonary                    (+) sleep apnea       Neuro/Psych                                    Past Surgical History:   Procedure Laterality Date   • Cyst removal      CYST REMOVED FROM TESTICLE   • Gastric bypass,obese<100cm bill-en-y       Social History     Socioeconomic History   • Marital status:    Tobacco Use   • Smoking status: Former     Current packs/day: 0.00     Types: Cigarettes     Quit date: 2000     Years since quittin.3     Passive exposure: Never   • Smokeless tobacco: Never   Vaping Use   • Vaping status: Never Used   Substance and Sexual Activity   • Alcohol use: Not Currently   • Drug use: Not Currently     History   Drug Use Unknown     Available pre-op labs reviewed.  Lab Results   Component Value Date    WBC 8.0 10/25/2024    RBC 4.69 10/25/2024    HGB 13.3 10/25/2024    HCT 39.1 10/25/2024    MCV 83.4 10/25/2024    MCH 28.4 10/25/2024    MCHC 34.0 10/25/2024    RDW 12.9 10/25/2024    .0  10/25/2024     Lab Results   Component Value Date     10/25/2024    K 4.0 10/25/2024     10/25/2024    CO2 25.0 10/25/2024    BUN 16 10/25/2024    CREATSERUM 1.18 10/25/2024     (H) 10/25/2024    CA 9.2 10/25/2024            Airway      Mallampati: III  Mouth opening: >3 FB  TM distance: > 6 cm   Cardiovascular             Dental             Pulmonary                     Other findings        ASA: 3   Plan: general  NPO status verified and     Post-procedure pain management plan discussed with surgeon and patient.    Comment: GETA/LMA discussed in detail.  Risk of complications discussed including but not limited to sore throat, cough, PONV discussed. Also, discussed risks including dental injury particularly on any weakened, treated or diseased teeth & pt wishes to proceed  All questions answered.    Plan/risks discussed with: patient            Present on Admission:  **None**             [1]   Medications Prior to Admission   Medication Sig Dispense Refill Last Dose/Taking   • Polyethylene Glycol 3350 (MIRALAX) 17 g Oral Powd Pack Take 17 g by mouth daily as needed (Take to avoid constipation, especially if taking narcotic pain medications.). 20 packet 1 Taking As Needed   • omega-3 fatty acids 1000 MG Oral Cap Take 1,000 mg by mouth daily.   Taking   • cetirizine 10 MG Oral Tab Take 1 tablet (10 mg total) by mouth daily.   Taking   • Multiple Vitamin (ONE-DAILY MULTI VITAMINS) Oral Tab Take 1 tablet by mouth daily.   Taking   • vitamin E 1000 UNITS Oral Cap Take 1 capsule (1,000 Units total) by mouth daily.   Taking

## 2024-11-26 NOTE — DISCHARGE INSTRUCTIONS
You had cystoscopy, ureteroscopy, and stent placement in the operating room today.    Instructions:    - No heavy lifting or strenuous activity for 1 day. You may resume regular activity tomorrow.       - Your follow-up appointment is listed below. Please contact us at [phone] if you need to change your appointment.     Your appointments       Date & Time Appointment Department ([place])    Dec 04, 2024 10:00 AM CST Procedure with Bert Norton MD [facility] ([place] [id number])              [place] [address]  [address]  [facility] [id number] [phone]             - You have a stent (small plastic tube) inside your kidney and ureter to allow the swelling from surgery to resolve. This is only temporary and must be removed, so you should not forget about it. We will remove your stent via a brief cystoscopy in clinic when you return. If you have to miss this appointment for some reason please make sure you re-schedule it.      - Take the antibiotics as given for 3 days    - With a ureteral stent in place you may have some discomfort on your side/flank. You can feel pain worsen when you urinate, so try to avoid holding urine and void every 2-3 hours. You also may notice increased blood in the urine as you increase your activity. If this happens increase your hydration and take it easy for a day. Warm baths/hot packs can help with the discomfort as well as your prescribed medications and Advil/Motrin (if you are able to take these).     - You may experience mild pain after the procedure for a few days.  If the pain becomes intolerable please contact our office or go to the nearest Emergency Room or Urgent Care. You should take over the counter ibuprofen (AKA motrin, advil) for mild pain (provided you do not have a medical condition such as stomach ulcers or kidney disease which prohibits you from  taking these). You may alternate this with tylenol as well. If pain is still not relieved by tylenol and/or ibuprofen, you may take narcotic pain medication if prescribed (typically oxycodone or tramadol). If you are taking narcotic pain medication this can make you constipated, so you should take over the counter stool softeners or miralax if prescribed.    You were given an ibuprofen product in the OR today at 9:30 am. You may take over the counter ibuprofen at 3:30 pm today.     - Warm pack or hot baths often help with discomfort after cystoscopy.    - If you take blood thinners (such as aspirin or plavix) please hold these medications until 3 days after surgery.     - You may experience burning and frequency of urination over the next few days. This will improve after a few days if you stay well hydrated. If you were prescribed phenazopyridine (Pyridium) this may relieve urinary discomfort but you can only take this for 3 days. Pyridium will make your urine orange.     - You are likely to see some blood in your urine (pink or light red urine) that should clear up within a few days. Staying well hydrated should help this clear up. If you notice the urine stays dark red or there are multiple large blood clots despite good hydration, please call the urology clinic ([phone]).     - Try to abstain from alcohol, coffee, tea, artificial sweeteners, and spicy food for the next 48 hours as these can irritate the bladder.     - If you develop fevers / chills, difficulty urinating, or abdominal pain that does not improve with pain medications, please call the office.     - Drink 1.5 to 2 liters of fluid today (water is preferable). If you are on a fluid restriction due to other medical reasons then you need to adhere to your fluid restriction recommendations.      Bert Norton MD  Staff Urologist  [facility]  Office: [phone]      You can take more tylenol at 1:30 pm today.

## 2024-11-26 NOTE — OPERATIVE REPORT
Urology Operative Note    Attending Surgeon: Bert Norton MD    Assistant Surgeon: None    Patient Name: Art Katz    Date of Surgery: 11/26/2024    Preoperative Diagnosis: RIGHT nephrolithiasis    Postoperative Diagnosis: Same    Procedure Performed:   Cystoscopy, RIGHT ureteroscopy, laser lithotripsy, basket stone extraction, retrograde pyelogram, ureteral stent placement    Indication:     Art Katz is a 69 year old male with history of obesity presenting for management of right ureteral stone.      Patient has urologic history notable for kidney stone 40 years ago.  Recalls passing a calcium oxalate stone.  More recently, he had a right hydrocelectomy in 2022 at [place].  He then presented to the ED on 10/26 with right-sided flank pain and groin pain for 2 days.  This was sudden onset.  Workup in the emergency room showed normal labs.  UA without infection.  CT scan with 8 mm right proximal ureteral stone with hydronephrosis, additional 9 mm nonobstructing left-sided stone.  Ultrasound of the scrotum was reassuring with small epididymal head cyst, left varicocele.  He was admitted due to ongoing pain.     Taken to OR on 10/26 ;   Normal urethra; mildly enlarged prostate. No bladder lesions; mildly trabeculated. Right RPG with moderate hydronephrosis. Right ureteral stent (6x28 JJ) placed without issue. No significant purulence on cannulation.     Here now for definitive URS for right sided stone.     The risks discussed included, but were not limited to, urinary tract infection, sepsis, bleeding, injury to urethra/bladder/ureter, urethra stricture, ureteral stricture, inability to treat stone. Patient agreeable to proceed and informed consent obtained      Findings:  Normal urethra. Mildly enlarged prostate. No bladder lesions. Right RPG with renal pelvis filling defect. Right URS with large stone now in renal pelvis about ~8mm in size and additional small mid pole ~3mm stone.   Stone all laser  fragmented/dusted and basket extracted. 6x28 JJ stent placed without issue.     Procedure:  The patient was taken to the operating room and a timeout was performed confirming the correct patient and procedure. The patient was prepped and draped in lithotomy position after undergoing general anesthesia. Pre-operative prophylactic antibiotics were given in the form of Ancef.     The cystoscope was inserted per urethra and the bladder was inspected and drained. The RIGHT ureteral stent was grasped and pulled to the urethral meatus with a cystoscopic grasper. The stent was cannulated with a Sensor wire, and the wire was passed into the renal pelvis which I confirmed using fluoroscopy. The stent was fully removed.    Then a dual lumen catheter was passed over the wire and into the distal ureter. A retrograde pyelogram was obtained demonstrating renal pelvis filling defect, no hydronephrosis. I then placed a second sensor wire through the dual lumen catheter under fluoroscopy. Once both wires were confirmed to be in the kidney I removed the dual lumen catheter. One wire was then secured to the drapes as a safety wire while the other was our working wire.      Over our working wire we gently introduced a 12/14F x 36cm ureteral access sheath under fluoroscopic visualization to the level of the proximal ureter. This passed easily. Once that was done, we then removed the inner sheath and wire.  The flexible ureteroscope was advanced into the sheath and up into the right renal pelvis.  A systematic inspection of the kidney revealed a large 8mm stone now in the renal pelvis and additional small ~3mm stone in a mid pole calyx.   The stones were fragmented using a laser, and then the fragments were extracted using a zero tip basket. Any smaller remaining stone fragments were lasered to dust, and thoroughly irrigated. All renal calyces were surveyed once more, and no large fragments were seen. A retrograde pyelogram was performed  through the scope and showed no extravasation. The ureter was inspected while slowly removing the scope and access sheath, and it appeared healthy without stone fragments seen.    We then placed a 6x28  double-J ureteral stent over our safety wire under direct cystoscopic and fluoroscopic guidance. The proximal and distal curls formed appropriately. Stent left without strings. The bladder was then drained and the cystoscope was removed. The procedure was then terminated.    The patient was awoken from anesthesia and transferred to PACU in stable condition. The patient tolerated the procedure well. All instrument/supply counts were correct at the end of the case.    Specimens:   Stone fragments for chemical analysis    Estimated Blood Loss:  1 mL    Tubes/Drains:  6-Tajik x 28 cm JJ right ureteral stent    Complications:   None immediate    Condition from OR:  Stable    Plan:   Abx x3 days    Return for stent removal as scheduled  Discuss management of left sided stone at follow up       Bert Norton MD  Staff Urologist  [facility]  Office: [phone]

## 2024-11-26 NOTE — ANESTHESIA PROCEDURE NOTES
Airway  Date/Time: 11/26/2024 8:43 AM  Urgency: elective      General Information and Staff    Patient location during procedure: OR  Anesthesiologist: Daryn Ventura MD  Performed: anesthesiologist   Performed by: Daryn Ventura MD  Authorized by: Daryn Ventura MD      Indications and Patient Condition  Indications for airway management: anesthesia  Sedation level: deep  Preoxygenated: yes  Patient position: sniffing  Mask difficulty assessment: 1 - vent by mask    Final Airway Details  Final airway type: endotracheal airway      Successful airway: ETT  Cuffed: yes   Successful intubation technique: Video laryngoscopy  Endotracheal tube insertion site: oral  Blade: GlideScope  Blade size: #3  ETT size (mm): 7.5    Placement verified by: capnometry   Cuff volume (mL): 10  Measured from: lips  ETT to lips (cm): 23  Number of attempts at approach: 1    Additional Comments  atraumatic

## 2024-11-26 NOTE — ANESTHESIA POSTPROCEDURE EVALUATION
[facility] Patient Status:  Hospital Outpatient Surgery   Age/Gender 69 year old male MRN [id number]   Location [facility] POST ANESTHESIA CARE UNIT Attending Bert Norton MD   Hosp Day # 0 PCP Mello Xie MD       Anesthesia Post-op Note    CYSTOSCOPY, RIGHT URETEROSCOPY, LASER LITHOTRIPSY, RIGHT STENT EXCHANGE.    Procedure Summary       Date: 11/26/24 Room / Location:  MAIN OR 07 /  MAIN OR    Anesthesia Start: 0834 Anesthesia Stop: 0935    Procedure: CYSTOSCOPY, RIGHT URETEROSCOPY, LASER LITHOTRIPSY, RIGHT STENT EXCHANGE. (Right: Ureter) Diagnosis:       Kidney stone      (Kidney stone [N20.0])    Surgeons: Bert Norton MD Anesthesiologist: Daryn Ventura MD    Anesthesia Type: general ASA Status: 3            Anesthesia Type: general    Vitals Value Taken Time   /87 11/26/24 0931   Temp 97 11/26/24 0935   Pulse 74 11/26/24 0934   Resp 13 11/26/24 0934   SpO2 94 % 11/26/24 0934   Vitals shown include unfiled device data.    Patient Location: PACU    Anesthesia Type: general    Airway Patency: patent    Postop Pain Control: adequate    Mental Status: mildly sedated but able to meaningfully participate in the post-anesthesia evaluation    Nausea/Vomiting: none    Cardiopulmonary/Hydration status: stable euvolemic    Complications: no apparent anesthesia related complications    Postop vital signs: stable    Dental Exam: Unchanged from Preop    Patient to be discharged from PACU when criteria met.

## 2024-12-03 LAB
CAOX MONOHYDRATE: 100 %
WEIGHT-STONE: 98 MG

## 2024-12-03 NOTE — PROGRESS NOTES
Clinic Procedure Note    INDICATIONS:      Art Katz is a 69 year old male with history of obesity presenting for stent removal after R URS on .      Patient has urologic history notable for kidney stone 40 years ago.  Recalls passing a calcium oxalate stone.  More recently, he had a right hydrocelectomy in  at [place].  He then presented to the ED on 10/26 with right-sided flank pain and groin pain for 2 days.  This was sudden onset.  Workup in the emergency room showed normal labs.  UA without infection.  CT scan with 8 mm right proximal ureteral stone with hydronephrosis, additional 9 mm nonobstructing left-sided stone.  Ultrasound of the scrotum was reassuring with small epididymal head cyst, left varicocele.  He was admitted due to ongoing pain.     Taken to OR on 10/26 ;   Normal urethra; mildly enlarged prostate. No bladder lesions; mildly trabeculated. Right RPG with moderate hydronephrosis. Right ureteral stent (6x28 JJ) placed without issue. No significant purulence on cannulation.     Definitive stone surgery on ;   Normal urethra. Mildly enlarged prostate. No bladder lesions. Right RPG with renal pelvis filling defect. Right URS with large stone now in renal pelvis about ~8mm in size and additional small mid pole ~3mm stone.   Stone all laser fragmented/dusted and basket extracted. 6x28 JJ stent placed without issue.      Hast large left sided stone as well (~8mm lower pole)  Stone analysis: COM  Doing well in the interval     PROCEDURE:       1. Flexible cystoscopy, removal of right ureteral stent ([id number])    DATE OF PROCEDURE: 12/3/2024     PRE-PROCEDURE DIAGNOSIS: Nephrolithiasis    POST-PROCEDURE DIAGNOSIS: Same     SURGEON: Bert Norton MD    FINDINGS:  Stent successfully removed in its entirety.     PROCEDURE:   Patient was brought to the procedure suite and a time-out was performed identifiying the patient,  and procedure to be performed. The risks and benefits of the  procedure were once again discussed with the patient including bleeding, infection, and dysuria. The patient agreed to proceed. The patient did not have any signs or symptoms of active UTI. Prophylactic PO antibiotics were given in clinic.    The patient was placed in supine position on the table and the genital area was prepped and draped in the standard sterile fashion. Urojet was used prior for local anesthesia effect. A flexible cystoscope was inserted per urethra. There were no urethral strictures present. The bladder was entered and the distal coil of the stent was seen protruding from the ureteral orifice. The stent was grasped with the flexible stent grasper, and then the stent and cystoscope were both removed. The stent was visualized outside the body and confirmed to be intact and fully removed.     There were no complications and the patient tolerated the procedure well.    IMPRESSION:  Successful stent removal after ureteroscopy today.    PLAN:   Discussed options for LEFT kidney stones- does not want intervention for now     -Renal/bladder ultrasound  in 2-3 to ensure no silent hydronephrosis and fu contralateral stone burden  Declined metabolic workup for now.     I discussed general fluid and dietary guidelines to help prevent further stone formation including:    - Fluid consumption of preferably water to make 2-2.5  L/day of urine  - Low sodium consumption  - Adequate calcium consumption (approximately 1200 mg/day)  - Low fat and moderate animal protein consumption  - Limit consumption  of oxalate rich foods   - I encouraged increased dietary intake of citrate with lemon juice (4 oz day)       Return in 3/4 months       Bert Norton MD  Staff Urologist  [facility]  Office: [phone]

## 2024-12-04 ENCOUNTER — PROCEDURE (OUTPATIENT)
Dept: SURGERY | Facility: CLINIC | Age: 69
End: 2024-12-04

## 2024-12-04 DIAGNOSIS — N20.0 KIDNEY STONE: Primary | ICD-10-CM

## 2024-12-04 LAB
APPEARANCE: CLEAR
GLUCOSE (URINE DIPSTICK): NEGATIVE MG/DL
MULTISTIX LOT#: ABNORMAL NUMERIC
NITRITE, URINE: NEGATIVE
PH, URINE: 5.5 (ref 4.5–8)
PROTEIN (URINE DIPSTICK): >=300 MG/DL
SPECIFIC GRAVITY: >=1.03 (ref 1–1.03)
URINE-COLOR: YELLOW
UROBILINOGEN,SEMI-QN: 1 MG/DL (ref 0–1.9)

## 2024-12-04 PROCEDURE — 81003 URINALYSIS AUTO W/O SCOPE: CPT | Performed by: UROLOGY

## 2024-12-04 PROCEDURE — 52310 CYSTOSCOPY AND TREATMENT: CPT | Performed by: UROLOGY

## 2024-12-04 RX ORDER — SULFAMETHOXAZOLE AND TRIMETHOPRIM 800; 160 MG/1; MG/1
1 TABLET ORAL ONCE
Status: COMPLETED | OUTPATIENT
Start: 2024-12-04 | End: 2024-12-04

## 2024-12-04 RX ADMIN — SULFAMETHOXAZOLE AND TRIMETHOPRIM 1 TABLET: 800; 160 TABLET ORAL at 10:12:00

## 2025-02-04 ENCOUNTER — HOSPITAL ENCOUNTER (OUTPATIENT)
Dept: GENERAL RADIOLOGY | Age: 70
Discharge: HOME | End: 2025-02-04
Attending: INTERNAL MEDICINE
Payer: MEDICARE

## 2025-02-04 ENCOUNTER — OFFICE VISIT (OUTPATIENT)
Dept: INTERNAL MEDICINE CLINIC | Facility: CLINIC | Age: 70
End: 2025-02-04

## 2025-02-04 VITALS
HEART RATE: 89 BPM | WEIGHT: 315 LBS | TEMPERATURE: 98 F | RESPIRATION RATE: 18 BRPM | DIASTOLIC BLOOD PRESSURE: 84 MMHG | OXYGEN SATURATION: 97 % | BODY MASS INDEX: 47.74 KG/M2 | SYSTOLIC BLOOD PRESSURE: 126 MMHG | HEIGHT: 68 IN

## 2025-02-04 DIAGNOSIS — M54.41 ACUTE RIGHT-SIDED LOW BACK PAIN WITH RIGHT-SIDED SCIATICA: Primary | ICD-10-CM

## 2025-02-04 DIAGNOSIS — M54.41 ACUTE RIGHT-SIDED LOW BACK PAIN WITH RIGHT-SIDED SCIATICA: ICD-10-CM

## 2025-02-04 PROCEDURE — 72110 X-RAY EXAM L-2 SPINE 4/>VWS: CPT | Performed by: INTERNAL MEDICINE

## 2025-02-04 PROCEDURE — 99214 OFFICE O/P EST MOD 30 MIN: CPT | Performed by: INTERNAL MEDICINE

## 2025-02-04 RX ORDER — CYCLOBENZAPRINE HCL 5 MG
5 TABLET ORAL NIGHTLY PRN
Qty: 30 TABLET | Refills: 0 | Status: SHIPPED | OUTPATIENT
Start: 2025-02-04

## 2025-02-04 RX ORDER — METHYLPREDNISOLONE 4 MG/1
TABLET ORAL
Qty: 1 EACH | Refills: 0 | Status: SHIPPED | OUTPATIENT
Start: 2025-02-04

## 2025-02-04 NOTE — PROGRESS NOTES
Subjective:     Patient ID: Art Katz is a 70 year old male.    Patient comes in today with complaint of low back pain going down the right buttock down the right leg symptoms started Friday Saturday he has been trying over-the-counter Tylenol Motrin has not helped much no weakness        History/Other:   Review of Systems   Constitutional: Negative.  Negative for fatigue and fever.   HENT: Negative.  Negative for congestion.    Eyes: Negative.    Respiratory: Negative.  Negative for cough, shortness of breath and wheezing.    Cardiovascular: Negative.  Negative for chest pain, palpitations and leg swelling.   Gastrointestinal: Negative.    Endocrine: Negative for cold intolerance and heat intolerance.   Genitourinary: Negative.  Negative for dysuria, flank pain and hematuria.   Musculoskeletal:  Positive for back pain. Negative for arthralgias and myalgias.   Skin: Negative.    Neurological: Negative.  Negative for dizziness, tremors, syncope, weakness and headaches.   Psychiatric/Behavioral: Negative.  Negative for agitation, behavioral problems and suicidal ideas. The patient is not nervous/anxious.      Current Outpatient Medications   Medication Sig Dispense Refill    methylPREDNISolone (MEDROL) 4 MG Oral Tablet Therapy Pack As directed. 1 each 0    cyclobenzaprine 5 MG Oral Tab Take 1 tablet (5 mg total) by mouth nightly as needed. 30 tablet 0    phenazopyridine (PYRIDIUM) 100 MG Oral Tab Take 1 tablet (100 mg total) by mouth 3 (three) times daily as needed for Pain. This will turn your urine orange. 10 tablet 0    Polyethylene Glycol 3350 (MIRALAX) 17 g Oral Powd Pack Take 17 g by mouth daily as needed (Take to avoid constipation, especially if taking narcotic pain medications.). 20 packet 1    Polyethylene Glycol 3350 (MIRALAX) 17 g Oral Powd Pack Take 17 g by mouth daily as needed (Take to avoid constipation, especially if taking narcotic pain medications.). 20 packet 1    omega-3 fatty acids 1000  MG Oral Cap Take 1,000 mg by mouth daily.      cetirizine 10 MG Oral Tab Take 1 tablet (10 mg total) by mouth daily.      Multiple Vitamin (ONE-DAILY MULTI VITAMINS) Oral Tab Take 1 tablet by mouth daily.      vitamin E 1000 UNITS Oral Cap Take 1 capsule (1,000 Units total) by mouth daily.      oxybutynin 5 MG Oral Tab Take 1 tablet (5 mg total) by mouth 3 (three) times daily as needed (Bladder spasms). Stop 12 hours before Melendez catheter removal in clinic (if applicable) 15 tablet 0     Allergies:Allergies[1]    Past Medical History:    Back problem    Calculus of kidney    COVID    Hearing impaired person, bilateral    HEARING AIDS    Kidney stone    Obesity    Sleep apnea    NO TX    Visual impairment    GLASSES      Past Surgical History:   Procedure Laterality Date    Cyst removal  2019    CYST REMOVED FROM TESTICLE    Gastric bypass,obese<100cm bill-en-y  2010      Family History   Problem Relation Age of Onset    Stroke Father     Cancer Mother     Dementia Mother     Dementia Maternal Grandfather     Depression Paternal Grandmother     Other (Other) Brother         Brain tumor    Other (other ) Brother         seizure      Social History:   Social History     Socioeconomic History    Marital status:    Tobacco Use    Smoking status: Former     Current packs/day: 0.00     Types: Cigarettes     Quit date: 2000     Years since quittin.5     Passive exposure: Never    Smokeless tobacco: Never   Vaping Use    Vaping status: Never Used   Substance and Sexual Activity    Alcohol use: Not Currently    Drug use: Not Currently     Social Drivers of Health     Food Insecurity: No Food Insecurity (10/25/2024)    Food Insecurity     Food Insecurity: Never true   Transportation Needs: No Transportation Needs (10/25/2024)    Transportation Needs     Lack of Transportation: No   Housing Stability: Low Risk  (10/25/2024)    Housing Stability     Housing Instability: No        Objective:   Physical  Exam  Vitals and nursing note reviewed.   Constitutional:       Appearance: He is well-developed.   HENT:      Head: Normocephalic and atraumatic.      Right Ear: External ear normal.      Left Ear: External ear normal.      Nose: Nose normal.   Eyes:      Conjunctiva/sclera: Conjunctivae normal.      Pupils: Pupils are equal, round, and reactive to light.   Cardiovascular:      Rate and Rhythm: Normal rate and regular rhythm.      Heart sounds: Normal heart sounds.   Pulmonary:      Effort: Pulmonary effort is normal.      Breath sounds: Normal breath sounds.   Abdominal:      General: Bowel sounds are normal.      Palpations: Abdomen is soft.   Genitourinary:     Penis: Normal.       Prostate: Normal.      Rectum: Normal.   Musculoskeletal:         General: Tenderness present. Normal range of motion.      Cervical back: Normal range of motion and neck supple.      Comments: Low back pain with radiation to rt lower ex   Skin:     General: Skin is warm and dry.   Neurological:      Mental Status: He is alert and oriented to person, place, and time.      Deep Tendon Reflexes: Reflexes are normal and symmetric.         Assessment & Plan:   1. Acute right-sided low back pain with right-sided sciatica will treat with steroids and muscle relaxant do warm compression will refer to physiatry if not better we will also get an x-ray will follow results       No orders of the defined types were placed in this encounter.      Meds This Visit:  Requested Prescriptions     Signed Prescriptions Disp Refills    methylPREDNISolone (MEDROL) 4 MG Oral Tablet Therapy Pack 1 each 0     Sig: As directed.    cyclobenzaprine 5 MG Oral Tab 30 tablet 0     Sig: Take 1 tablet (5 mg total) by mouth nightly as needed.       Imaging & Referrals:  PHYSIATRY - INTERNAL  XR LUMBAR SPINE (MIN 4 VIEWS) (CPT=72110)            [1]   Allergies  Allergen Reactions    Seasonal OTHER (SEE COMMENTS)     Runny nose

## 2025-02-28 ENCOUNTER — HOSPITAL ENCOUNTER (OUTPATIENT)
Dept: ULTRASOUND IMAGING | Age: 70
Discharge: HOME | End: 2025-02-28
Attending: UROLOGY
Payer: MEDICARE

## 2025-02-28 DIAGNOSIS — N20.0 KIDNEY STONE: ICD-10-CM

## 2025-02-28 PROCEDURE — 76770 US EXAM ABDO BACK WALL COMP: CPT | Performed by: UROLOGY

## 2025-03-05 ENCOUNTER — OFFICE VISIT (OUTPATIENT)
Dept: SURGERY | Facility: CLINIC | Age: 70
End: 2025-03-05

## 2025-03-05 DIAGNOSIS — N20.0 KIDNEY STONE: Primary | ICD-10-CM

## 2025-03-05 PROCEDURE — 99214 OFFICE O/P EST MOD 30 MIN: CPT | Performed by: UROLOGY

## 2025-03-05 PROCEDURE — G2211 COMPLEX E/M VISIT ADD ON: HCPCS | Performed by: UROLOGY

## 2025-03-05 NOTE — PROGRESS NOTES
Urology Clinic Note    Primary Care Provider:  Mello Xie MD     Chief Complaint:   Kidney stones    HPI:      Art Katz is a 70 year old male with history of obesity presenting for stent removal after R URS on 11/26.      Patient has urologic history notable for kidney stone 40 years ago.  Recalls passing a calcium oxalate stone.  More recently, he had a right hydrocelectomy in 2022 at [place].  He then presented to the ED on 10/26 with right-sided flank pain and groin pain for 2 days.  This was sudden onset.  Workup in the emergency room showed normal labs.  UA without infection.  CT scan with 8 mm right proximal ureteral stone with hydronephrosis, additional 9 mm nonobstructing left-sided stone.  Ultrasound of the scrotum was reassuring with small epididymal head cyst, left varicocele.  He was admitted due to ongoing pain.     Taken to OR on 10/26 ;   Normal urethra; mildly enlarged prostate. No bladder lesions; mildly trabeculated. Right RPG with moderate hydronephrosis. Right ureteral stent (6x28 JJ) placed without issue. No significant purulence on cannulation.     Definitive stone surgery on 11/26;   Normal urethra. Mildly enlarged prostate. No bladder lesions. Right RPG with renal pelvis filling defect. Right URS with large stone now in renal pelvis about ~8mm in size and additional small mid pole ~3mm stone.   Stone all laser fragmented/dusted and basket extracted. 6x28 JJ stent placed without issue.      Hast large left sided stone as well (~8mm lower pole)  Stone analysis: COM    Stent was later removed.  Patient did not want metabolic workup.  Ultrasound was ordered.  Returns for follow-up.  Patient has been dealing with acute on chronic back pain.  This is not feeling his kidney stone history.  He is working on weight loss, eating healthy.  No specific kidney stone like symptoms.  No gross hematuria.  No bothersome urinary symptoms at this time.  His ultrasound is currently pending, on my  review stable 7 mm stone in the left kidney, no obvious stones on the right side.  No obvious hydronephrosis.        PSA:  Lab Results   Component Value Date    PSAS 1.12 2023    PSAS 0.82 2020        History:     Past Medical History:    Back problem    Calculus of kidney    COVID    Hearing impaired person, bilateral    HEARING AIDS    Kidney stone    Obesity    Sleep apnea    NO TX    Visual impairment    GLASSES       Past Surgical History:   Procedure Laterality Date    Cyst removal      CYST REMOVED FROM TESTICLE    Gastric bypass,obese<100cm bill-en-y         Family History   Problem Relation Age of Onset    Stroke Father     Cancer Mother     Dementia Mother     Dementia Maternal Grandfather     Depression Paternal Grandmother     Other (Other) Brother         Brain tumor    Other (other ) Brother         seizure       Social History     Socioeconomic History    Marital status:    Tobacco Use    Smoking status: Former     Current packs/day: 0.00     Types: Cigarettes     Quit date: 2000     Years since quittin.6     Passive exposure: Never    Smokeless tobacco: Never   Vaping Use    Vaping status: Never Used   Substance and Sexual Activity    Alcohol use: Not Currently    Drug use: Not Currently     Social Drivers of Health     Food Insecurity: No Food Insecurity (10/25/2024)    Food Insecurity     Food Insecurity: Never true   Transportation Needs: No Transportation Needs (10/25/2024)    Transportation Needs     Lack of Transportation: No   Housing Stability: Low Risk  (10/25/2024)    Housing Stability     Housing Instability: No       Medications (Active prior to today's visit):  Current Outpatient Medications   Medication Sig Dispense Refill    omega-3 fatty acids 1000 MG Oral Cap Take 1,000 mg by mouth daily.      cetirizine 10 MG Oral Tab Take 1 tablet (10 mg total) by mouth daily.      Multiple Vitamin (ONE-DAILY MULTI VITAMINS) Oral Tab Take 1 tablet by mouth  daily.      vitamin E 1000 UNITS Oral Cap Take 1 capsule (1,000 Units total) by mouth daily.      methylPREDNISolone (MEDROL) 4 MG Oral Tablet Therapy Pack As directed. (Patient not taking: Reported on 3/5/2025) 1 each 0    Polyethylene Glycol 3350 (MIRALAX) 17 g Oral Powd Pack Take 17 g by mouth daily as needed (Take to avoid constipation, especially if taking narcotic pain medications.). (Patient not taking: Reported on 3/5/2025) 20 packet 1       Allergies:  Allergies[1]    Review of Systems:   A comprehensive 10-point review of systems was completed.  Pertinent positives and negatives are noted in the the HPI.    Physical Exam:   CONSTITUTIONAL: Well developed, well nourished, in no acute distress  ABDOMEN: Soft, non-tender, non-distended     Assessment & Plan:     Nephrolithiasis  History above discussed.  Again offered metabolic workup, patient wants to hold off for now.  We did again discuss healthy stone diet in detail.  For his nonobstructing contralateral stone, he is currently not interested in surgery.  Warning signs of obstruction discussed.  He states he is currently working on other health issues but does want to possibly address the stones next year.  We will therefore obtain a CT scan in about 9 to 12 months.  I will follow-up the final read of his ultrasound.  I will call patient if there is any other abnormalities.    Return with CT scan in 9 to 12 months    In total, 30 minutes were spent on this patient encounter (including chart review, patient history, physical, and counseling, documentation, and communication).    Bert Norton MD  Staff Urologist  [facility]  Office: [phone]         [1]   Allergies  Allergen Reactions    Seasonal OTHER (SEE COMMENTS)     Runny nose

## 2025-03-20 ENCOUNTER — HOSPITAL ENCOUNTER (OUTPATIENT)
Age: 70
Discharge: HOME | End: 2025-03-20
Payer: MEDICARE

## 2025-03-20 VITALS
SYSTOLIC BLOOD PRESSURE: 123 MMHG | HEART RATE: 60 BPM | TEMPERATURE: 98 F | OXYGEN SATURATION: 96 % | RESPIRATION RATE: 18 BRPM | DIASTOLIC BLOOD PRESSURE: 71 MMHG

## 2025-03-20 DIAGNOSIS — L03.211 FACIAL CELLULITIS: Primary | ICD-10-CM

## 2025-03-20 PROCEDURE — 99213 OFFICE O/P EST LOW 20 MIN: CPT | Performed by: PHYSICIAN ASSISTANT

## 2025-03-20 RX ORDER — MUPIROCIN 20 MG/G
1 OINTMENT TOPICAL 2 TIMES DAILY
Qty: 1 G | Refills: 0 | Status: SHIPPED | OUTPATIENT
Start: 2025-03-20 | End: 2025-03-30

## 2025-03-20 RX ORDER — DOXYCYCLINE 100 MG/1
100 CAPSULE ORAL 2 TIMES DAILY
Qty: 14 CAPSULE | Refills: 0 | Status: SHIPPED | OUTPATIENT
Start: 2025-03-20 | End: 2025-03-27

## 2025-03-20 NOTE — ED PROVIDER NOTES
Chief Complaint   Patient presents with    Rash Skin Problem       HPI:     Art Katz is a 70 year old male who presents for evaluation of rash along the right side of the face that began last week, notes developing on the neck after shaving within the last few days.  Notes slight irritation around the eyes developing today bilaterally.  Denies history of shingles eye issues including glaucoma or cataracts or MRSA.  Denies associated fevers chills headache dizziness blurred vision photophobia eye discharge nasal congestion sore throat dysphagia neck pain chest pain shortness of breath vomiting or diarrhea      PFSH    PFSH asessment screens reviewed and agree.  Nurses notes reviewed I agree with documentation.    Family History   Problem Relation Age of Onset    Stroke Father     Cancer Mother     Dementia Mother     Dementia Maternal Grandfather     Depression Paternal Grandmother     Other (Other) Brother         Brain tumor    Other (other ) Brother         seizure     Family history reviewed with patient/caregiver and is not pertinent to presenting problem.  Social History     Socioeconomic History    Marital status:      Spouse name: Not on file    Number of children: Not on file    Years of education: Not on file    Highest education level: Not on file   Occupational History    Not on file   Tobacco Use    Smoking status: Former     Current packs/day: 0.00     Types: Cigarettes     Quit date: 2000     Years since quittin.6     Passive exposure: Never    Smokeless tobacco: Never   Vaping Use    Vaping status: Never Used   Substance and Sexual Activity    Alcohol use: Not Currently    Drug use: Not Currently    Sexual activity: Not on file   Other Topics Concern    Not on file   Social History Narrative    Not on file     Social Drivers of Health     Food Insecurity: No Food Insecurity (10/25/2024)    Food Insecurity     Food Insecurity: Never true   Transportation Needs: No  Transportation Needs (10/25/2024)    Transportation Needs     Lack of Transportation: No     Car Seat: Not on file   Housing Stability: Low Risk  (10/25/2024)    Housing Stability     Housing Instability: No     Housing Instability Emergency: Not on file     Crib or [place]inette: Not on file         ROS:   Positive for stated complaint: Rash.  All other systems reviewed and negative except as noted above.  Constitutional and Vital Signs Reviewed.      Physical Exam:     Findings:    /71   Pulse 60   Temp 97.7 °F (36.5 °C) (Oral)   Resp 18   SpO2 96%   GENERAL: well developed, well nourished, well hydrated, no distress  SKIN: good skin turgor, raised macular erythema along the right lateral face.  Several lesions along the anterior neck bilaterally.  No vesicles.  No periorbital involvement edema visualized hordeolum chemosis or proptosis, no conjunctivitis, fluorescein stain without visualized dendrite lesion or abrasion.  NECK: supple, no adenopathy  EXTREMITIES: no cyanosis or edema. TREVINO without difficulty  GI: soft, non-tender, normal bowel sounds  HEAD: normocephalic, atraumatic  EYES: Pupils PERRL.  Vision grossly normal bilaterally.  Sclera non icteric bilateral, conjunctiva clear  EARS: TMs clear bilaterally. Canals clear.  NOSE: [place] MMM.  Nasal turbinates: pink, normal mucosa  THROAT: clear, without exudates, uvula midline, and airway patent  LUNGS: clear to auscultation bilaterally; no rales, rhonchi, or wheezes  NEURO: No focal deficits  PSYCH: Alert and oriented x3.  Answering questions appropriately.  Mood appropriate.    MDM/Assessment/Plan:   Orders for this encounter:    Orders Placed This Encounter    Visual acuity screening    mupirocin 2 % External Ointment     Sig: Apply 1 Application topically 2 (two) times daily for 10 days.     Dispense:  1 g     Refill:  0    doxycycline 100 MG Oral Cap     Sig: Take 1 capsule (100 mg total) by mouth 2 (two) times daily for 7 days.     Dispense:  14  capsule     Refill:  0       Labs performed this visit:  No results found for this or any previous visit (from the past 10 hours).    MDM:  Patient was 20/25 bilaterally with corrective lenses.    Patient family agreeable for management of potential cellulitis folliculitis with combination topical and oral antibiotics, agrees with no eye medications based on presentation evaluation today yet to readdress through dermatology over the days ahead, instructed on changes warranting emergent reassessment    Diagnosis:    ICD-10-CM    1. Facial cellulitis  L03.211           All results reviewed and discussed with patient.  See AVS for detailed discharge instructions for your condition today.    Follow Up with:  Mello Xie MD  [address] [id number] [phone]      As needed    Jo Ann Guerrero MD  [address] [place] [address] [phone]    Schedule an appointment as soon as possible for a visit in 1 week  dermatology, As needed    Dharmesh Reynolds MD  [address]  [address]  [place] [address] [phone]      dermatology 2nd referral

## 2025-03-20 NOTE — ED INITIAL ASSESSMENT (HPI)
Pt with rash on side or right eye that began last week. Now rash on right neck, nose, eye lids. No drainage. Denies fever.

## 2025-03-20 NOTE — DISCHARGE INSTRUCTIONS
Take probiotic FLORASTOR recommended . ASK PHARMACIST UPON ARRIVAL.     READDRESS IF WORSENING ISSUES WITH DERMATOLOGY NEXT WEEK.     GO TO ER FOR BREAKTHROUGH CHANGES/CONCERNS.

## 2025-03-25 ENCOUNTER — OFFICE VISIT (OUTPATIENT)
Dept: INTERNAL MEDICINE CLINIC | Facility: CLINIC | Age: 70
End: 2025-03-25

## 2025-03-25 ENCOUNTER — HOSPITAL ENCOUNTER (OUTPATIENT)
Dept: CT IMAGING | Facility: HOSPITAL | Age: 70
Discharge: HOME | End: 2025-03-25
Attending: OTOLARYNGOLOGY
Payer: MEDICARE

## 2025-03-25 VITALS
RESPIRATION RATE: 18 BRPM | HEART RATE: 60 BPM | SYSTOLIC BLOOD PRESSURE: 126 MMHG | DIASTOLIC BLOOD PRESSURE: 74 MMHG | WEIGHT: 309 LBS | TEMPERATURE: 99 F | BODY MASS INDEX: 46.83 KG/M2 | OXYGEN SATURATION: 99 % | HEIGHT: 68 IN

## 2025-03-25 DIAGNOSIS — Z12.5 ENCOUNTER FOR SCREENING FOR MALIGNANT NEOPLASM OF PROSTATE: ICD-10-CM

## 2025-03-25 DIAGNOSIS — G47.33 OSA (OBSTRUCTIVE SLEEP APNEA): ICD-10-CM

## 2025-03-25 DIAGNOSIS — J32.9 RECURRENT SINUS INFECTIONS: ICD-10-CM

## 2025-03-25 DIAGNOSIS — Z98.84 HISTORY OF GASTRIC BYPASS: ICD-10-CM

## 2025-03-25 DIAGNOSIS — H90.3 SENSORINEURAL HEARING LOSS, BILATERAL: ICD-10-CM

## 2025-03-25 DIAGNOSIS — Z13.220 SCREENING, LIPID: ICD-10-CM

## 2025-03-25 DIAGNOSIS — J30.2 SEASONAL ALLERGIES: ICD-10-CM

## 2025-03-25 DIAGNOSIS — D64.9 ANEMIA, UNSPECIFIED TYPE: ICD-10-CM

## 2025-03-25 DIAGNOSIS — R41.3 MEMORY PROBLEM: ICD-10-CM

## 2025-03-25 DIAGNOSIS — Z86.69 HISTORY OF SLEEP APNEA: ICD-10-CM

## 2025-03-25 DIAGNOSIS — Z00.00 MEDICARE ANNUAL WELLNESS VISIT, SUBSEQUENT: Primary | ICD-10-CM

## 2025-03-25 DIAGNOSIS — R93.3 ABNORMAL FINDINGS ON DIAGNOSTIC IMAGING OF OTHER PARTS OF DIGESTIVE TRACT: ICD-10-CM

## 2025-03-25 LAB
ALBUMIN SERPL-MCNC: 4.6 G/DL (ref 3.2–4.8)
ALBUMIN/GLOB SERPL: 1.8 {RATIO} (ref 1–2)
ALP LIVER SERPL-CCNC: 68 U/L
ALT SERPL-CCNC: 24 U/L
ANION GAP SERPL CALC-SCNC: 8 MMOL/L (ref 0–18)
AST SERPL-CCNC: 27 U/L (ref ?–34)
BASOPHILS # BLD AUTO: 0.05 X10(3) UL (ref 0–0.2)
BASOPHILS NFR BLD AUTO: 0.9 %
BILIRUB SERPL-MCNC: 0.5 MG/DL (ref 0.2–1.1)
BILIRUB UR QL: NEGATIVE
BUN BLD-MCNC: 9 MG/DL (ref 9–23)
BUN/CREAT SERPL: 10.8 (ref 10–20)
CALCIUM BLD-MCNC: 9.1 MG/DL (ref 8.7–10.4)
CHLORIDE SERPL-SCNC: 105 MMOL/L (ref 98–112)
CHOLEST SERPL-MCNC: 125 MG/DL (ref ?–200)
CLARITY UR: CLEAR
CO2 SERPL-SCNC: 27 MMOL/L (ref 21–32)
COMPLEXED PSA SERPL-MCNC: 0.8 NG/ML (ref ?–4)
CREAT BLD-MCNC: 0.83 MG/DL
DEPRECATED RDW RBC AUTO: 42.7 FL (ref 35.1–46.3)
EGFRCR SERPLBLD CKD-EPI 2021: 94 ML/MIN/1.73M2 (ref 60–?)
EOSINOPHIL # BLD AUTO: 0.12 X10(3) UL (ref 0–0.7)
EOSINOPHIL NFR BLD AUTO: 2.2 %
ERYTHROCYTE [DISTWIDTH] IN BLOOD BY AUTOMATED COUNT: 13.6 % (ref 11–15)
FASTING PATIENT LIPID ANSWER: YES
FASTING STATUS PATIENT QL REPORTED: YES
GLOBULIN PLAS-MCNC: 2.6 G/DL (ref 2–3.5)
GLUCOSE BLD-MCNC: 82 MG/DL (ref 70–99)
GLUCOSE UR-MCNC: NORMAL MG/DL
HCT VFR BLD AUTO: 40 %
HDLC SERPL-MCNC: 50 MG/DL (ref 40–59)
HGB BLD-MCNC: 13.1 G/DL
HGB UR QL STRIP.AUTO: NEGATIVE
IMM GRANULOCYTES # BLD AUTO: 0.01 X10(3) UL (ref 0–1)
IMM GRANULOCYTES NFR BLD: 0.2 %
KETONES UR-MCNC: NEGATIVE MG/DL
LDLC SERPL CALC-MCNC: 57 MG/DL (ref ?–100)
LEUKOCYTE ESTERASE UR QL STRIP.AUTO: NEGATIVE
LYMPHOCYTES # BLD AUTO: 1.95 X10(3) UL (ref 1–4)
LYMPHOCYTES NFR BLD AUTO: 35.8 %
MCH RBC QN AUTO: 28.1 PG (ref 26–34)
MCHC RBC AUTO-ENTMCNC: 32.8 G/DL (ref 31–37)
MCV RBC AUTO: 85.7 FL
MONOCYTES # BLD AUTO: 0.66 X10(3) UL (ref 0.1–1)
MONOCYTES NFR BLD AUTO: 12.1 %
NEUTROPHILS # BLD AUTO: 2.66 X10 (3) UL (ref 1.5–7.7)
NEUTROPHILS # BLD AUTO: 2.66 X10(3) UL (ref 1.5–7.7)
NEUTROPHILS NFR BLD AUTO: 48.8 %
NITRITE UR QL STRIP.AUTO: NEGATIVE
NONHDLC SERPL-MCNC: 75 MG/DL (ref ?–130)
OSMOLALITY SERPL CALC.SUM OF ELEC: 288 MOSM/KG (ref 275–295)
PH UR: 5.5 [PH] (ref 5–8)
PLATELET # BLD AUTO: 202 10(3)UL (ref 150–450)
POTASSIUM SERPL-SCNC: 3.8 MMOL/L (ref 3.5–5.1)
PROT SERPL-MCNC: 7.2 G/DL (ref 5.7–8.2)
PROT UR-MCNC: NEGATIVE MG/DL
RBC # BLD AUTO: 4.67 X10(6)UL
SODIUM SERPL-SCNC: 140 MMOL/L (ref 136–145)
SP GR UR STRIP: 1.01 (ref 1–1.03)
TRIGL SERPL-MCNC: 92 MG/DL (ref 30–149)
TSI SER-ACNC: 2.56 UIU/ML (ref 0.55–4.78)
UROBILINOGEN UR STRIP-ACNC: NORMAL
VLDLC SERPL CALC-MCNC: 13 MG/DL (ref 0–30)
WBC # BLD AUTO: 5.5 X10(3) UL (ref 4–11)

## 2025-03-25 PROCEDURE — 80053 COMPREHEN METABOLIC PANEL: CPT | Performed by: INTERNAL MEDICINE

## 2025-03-25 PROCEDURE — 81003 URINALYSIS AUTO W/O SCOPE: CPT | Performed by: INTERNAL MEDICINE

## 2025-03-25 PROCEDURE — 70486 CT MAXILLOFACIAL W/O DYE: CPT | Performed by: OTOLARYNGOLOGY

## 2025-03-25 PROCEDURE — 85025 COMPLETE CBC W/AUTO DIFF WBC: CPT | Performed by: INTERNAL MEDICINE

## 2025-03-25 PROCEDURE — 80061 LIPID PANEL: CPT | Performed by: INTERNAL MEDICINE

## 2025-03-25 PROCEDURE — 84443 ASSAY THYROID STIM HORMONE: CPT | Performed by: INTERNAL MEDICINE

## 2025-03-30 PROBLEM — R53.83 FATIGUE: Status: RESOLVED | Noted: 2020-07-23 | Resolved: 2025-03-30

## 2025-03-30 PROBLEM — N43.3 RIGHT HYDROCELE: Status: RESOLVED | Noted: 2020-07-23 | Resolved: 2025-03-30

## 2025-03-30 PROBLEM — R42 DIZZY SPELLS: Status: RESOLVED | Noted: 2024-09-06 | Resolved: 2025-03-30

## 2025-03-30 PROBLEM — D64.9 ANEMIA: Status: RESOLVED | Noted: 2020-08-26 | Resolved: 2025-03-30

## 2025-03-30 PROBLEM — J01.91 ACUTE RECURRENT SINUSITIS: Status: RESOLVED | Noted: 2023-09-27 | Resolved: 2025-03-30

## 2025-03-30 PROBLEM — N20.1 RIGHT URETERAL STONE: Status: RESOLVED | Noted: 2024-10-25 | Resolved: 2025-03-30

## 2025-03-31 NOTE — PROGRESS NOTES
Subjective:   Art Katz is a 70 year old male who presents for a Medicare Subsequent Annual Wellness visit (Pt already had Initial Annual Wellness) and .     Patient comes in for Medicare annual overall doing okay denies any complaints    History/Other:   Fall Risk Assessment:   He has been screened for Falls and is low risk.      Cognitive Assessment:   He had a completely normal cognitive assessment - see flowsheet entries     Functional Ability/Status:   Art Katz has some abnormal functions as listed below:  He has Hearing problems based on screening of functional status.He has Vision problems based on screening of functional status.       Depression Screening (PHQ):  PHQ-2 SCORE: 0  , done 3/25/2025   If you checked off any problems, how difficult have these problems made it for you to do your work, take care of things at home, or get along with other people?: Not difficult at all    Last [place] Suicide Screening on 3/25/2025 was No Risk.          Advanced Directives:   He does NOT have a Living Will. [Do you have a living will?: No]  He does NOT have a Power of  for Health Care. [Do you have a healthcare power of ?: No]  Discussed Advance Care Planning with patient (and family/surrogate if present). Standard forms made available to patient in After Visit Summary.      Patient Active Problem List   Diagnosis    Class 3 severe obesity due to excess calories without serious comorbidity with body mass index (BMI) of 45.0 to 49.9 in adult (HCC)    Seasonal allergies    Memory problem    History of gastric bypass    History of sleep apnea    Family history of dementia    Sensorineural hearing loss, bilateral     Allergies:  He is allergic to seasonal.    Current Medications:  Outpatient Medications Marked as Taking for the 3/25/25 encounter (Office Visit) with Mello Xie MD   Medication Sig    mupirocin 2 % External Ointment Apply 1 Application topically 2 (two) times daily  for 10 days.    [] doxycycline 100 MG Oral Cap Take 1 capsule (100 mg total) by mouth 2 (two) times daily for 7 days.    omega-3 fatty acids 1000 MG Oral Cap Take 1,000 mg by mouth daily.    cetirizine 10 MG Oral Tab Take 1 tablet (10 mg total) by mouth daily.    Multiple Vitamin (ONE-DAILY MULTI VITAMINS) Oral Tab Take 1 tablet by mouth daily.    vitamin E 1000 UNITS Oral Cap Take 1 capsule (1,000 Units total) by mouth daily.       Medical History:  He  has a past medical history of Back problem, Calculus of kidney, COVID, Hearing impaired person, bilateral, Kidney stone, Obesity, Sleep apnea, and Visual impairment.  Surgical History:  He  has a past surgical history that includes gastric bypass,obese<100cm bill-en-y (2010) and cyst removal (2019).   Family History:  His family history includes Cancer in his mother; Dementia in his maternal grandfather and mother; Depression in his paternal grandmother; Other in his brother; Stroke in his father; other in his brother.  Social History:  He  reports that he quit smoking about 24 years ago. His smoking use included cigarettes. He has never been exposed to tobacco smoke. He has never used smokeless tobacco. He reports that he does not currently use alcohol. He reports that he does not currently use drugs.    Tobacco:  He smoked tobacco in the past but quit greater than 12 months ago.  Social History     Tobacco Use   Smoking Status Former    Current packs/day: 0.00    Types: Cigarettes    Quit date: 2000    Years since quittin.7    Passive exposure: Never   Smokeless Tobacco Never        CAGE Alcohol Screen:   CAGE screening score of 0 on 3/25/2025, showing low risk of alcohol abuse.      Patient Care Team:  Mello Xie MD as PCP - General (Internal Medicine)    Review of Systems   Constitutional: Negative.    HENT: Negative.     Eyes: Negative.    Respiratory: Negative.     Cardiovascular: Negative.    Gastrointestinal: Negative.    Genitourinary:  Negative.    Musculoskeletal: Negative.    Skin: Negative.    Neurological: Negative.    Psychiatric/Behavioral: Negative.            Objective:   Physical Exam  Vitals and nursing note reviewed.   Constitutional:       Appearance: He is well-developed.   HENT:      Head: Normocephalic and atraumatic.      Right Ear: External ear normal.      Left Ear: External ear normal.      Nose: Nose normal.   Eyes:      Conjunctiva/sclera: Conjunctivae normal.      Pupils: Pupils are equal, round, and reactive to light.   Cardiovascular:      Rate and Rhythm: Normal rate and regular rhythm.      Heart sounds: Normal heart sounds.   Pulmonary:      Effort: Pulmonary effort is normal.      Breath sounds: Normal breath sounds.   Abdominal:      General: Bowel sounds are normal.      Palpations: Abdomen is soft.   Genitourinary:     Penis: Normal.       Prostate: Normal.      Rectum: Normal.   Musculoskeletal:         General: Normal range of motion.      Cervical back: Normal range of motion and neck supple.   Skin:     General: Skin is warm and dry.   Neurological:      Mental Status: He is alert and oriented to person, place, and time.      Deep Tendon Reflexes: Reflexes are normal and symmetric.          /74 (BP Location: Left arm, Patient Position: Sitting, Cuff Size: large)   Pulse 60   Temp 98.7 °F (37.1 °C) (Oral)   Resp 18   Ht 5' 8\" (1.727 m)   Wt (!) 309 lb (140.2 kg)   SpO2 99%   BMI 46.98 kg/m²  Estimated body mass index is 46.98 kg/m² as calculated from the following:    Height as of this encounter: 5' 8\" (1.727 m).    Weight as of this encounter: 309 lb (140.2 kg).    Medicare Hearing Assessment:   Hearing Screening    Screening Method: Questionnaire  I have a problem hearing over the telephone: No I have trouble following the conversations when two or more people are talking at the same time: No   I have trouble understanding things on the TV: No I have to strain to understand conversations: No   I  have to worry about missing the telephone ring or doorbell: No I have trouble hearing conversations in a noisy background such as a crowded room or restaurant: No   I get confused about where sounds come from: No I misunderstand some words in a sentence and need to ask people to repeat themselves: No   I especially have trouble understanding the speech of women and children: No I have trouble understanding the speaker in a large room such as at a meeting or place of Yazidism: No   Many people I talk to seem to mumble (or don't speak clearly): No People get annoyed because I misunderstand what they say: No   I misunderstand what others are saying and make inappropriate responses: No I avoid social activities because I cannot hear well and fear I will reply improperly: No   Family members and friends have told me they think I may have hearing loss: No             Visual Acuity:   Right Eye Visual Acuity: Corrected Right Eye Chart Acuity: 20/25   Left Eye Visual Acuity: Corrected Left Eye Chart Acuity: 20/25   Both Eyes Visual Acuity: Corrected Both Eyes Chart Acuity: 20/25   Able To Tolerate Visual Acuity: Yes        Assessment & Plan:   Art Katz is a 70 year old male who presents for a Medicare Assessment.     1. Medicare annual wellness visit, subsequent (Primary)-exam is okay will order labs  -     EKG 12 Lead; Future; Expected date: 03/25/2025  -     CBC With Differential With Platelet; Future; Expected date: 03/25/2025  -     Comp Metabolic Panel (14); Future; Expected date: 03/25/2025  -     Lipid Panel; Future; Expected date: 03/25/2025  -     TSH W Reflex To Free T4; Future; Expected date: 03/25/2025  -     Urinalysis, Routine; Future; Expected date: 03/25/2025  -     PSA Total, Screen; Future; Expected date: 03/25/2025  -     CBC With Differential With Platelet  -     Comp Metabolic Panel (14)  -     Lipid Panel  -     TSH W Reflex To Free T4  -     Urinalysis, Routine  -     PSA Total, Screen  2.  RENETTA (obstructive sleep apnea) continue current treatment  -     EKG 12 Lead; Future; Expected date: 03/25/2025  -     CBC With Differential With Platelet; Future; Expected date: 03/25/2025  -     Comp Metabolic Panel (14); Future; Expected date: 03/25/2025  -     Lipid Panel; Future; Expected date: 03/25/2025  -     TSH W Reflex To Free T4; Future; Expected date: 03/25/2025  -     Urinalysis, Routine; Future; Expected date: 03/25/2025  -     PSA Total, Screen; Future; Expected date: 03/25/2025  -     CBC With Differential With Platelet  -     Comp Metabolic Panel (14)  -     Lipid Panel  -     TSH W Reflex To Free T4  -     Urinalysis, Routine  -     PSA Total, Screen  3. History of gastric bypass stable  -     EKG 12 Lead; Future; Expected date: 03/25/2025  -     CBC With Differential With Platelet; Future; Expected date: 03/25/2025  -     Comp Metabolic Panel (14); Future; Expected date: 03/25/2025  -     Lipid Panel; Future; Expected date: 03/25/2025  -     TSH W Reflex To Free T4; Future; Expected date: 03/25/2025  -     Urinalysis, Routine; Future; Expected date: 03/25/2025  -     PSA Total, Screen; Future; Expected date: 03/25/2025  -     CBC With Differential With Platelet  -     Comp Metabolic Panel (14)  -     Lipid Panel  -     TSH W Reflex To Free T4  -     Urinalysis, Routine  -     PSA Total, Screen  4. Memory problem chronic stable will follow labs  -     EKG 12 Lead; Future; Expected date: 03/25/2025  -     CBC With Differential With Platelet; Future; Expected date: 03/25/2025  -     Comp Metabolic Panel (14); Future; Expected date: 03/25/2025  -     Lipid Panel; Future; Expected date: 03/25/2025  -     TSH W Reflex To Free T4; Future; Expected date: 03/25/2025  -     Urinalysis, Routine; Future; Expected date: 03/25/2025  -     PSA Total, Screen; Future; Expected date: 03/25/2025  -     CBC With Differential With Platelet  -     Comp Metabolic Panel (14)  -     Lipid Panel  -     TSH W Reflex To Free  T4  -     Urinalysis, Routine  -     PSA Total, Screen  5. Seasonal allergies medical management  -     EKG 12 Lead; Future; Expected date: 03/25/2025  -     CBC With Differential With Platelet; Future; Expected date: 03/25/2025  -     Comp Metabolic Panel (14); Future; Expected date: 03/25/2025  -     Lipid Panel; Future; Expected date: 03/25/2025  -     TSH W Reflex To Free T4; Future; Expected date: 03/25/2025  -     Urinalysis, Routine; Future; Expected date: 03/25/2025  -     PSA Total, Screen; Future; Expected date: 03/25/2025  -     CBC With Differential With Platelet  -     Comp Metabolic Panel (14)  -     Lipid Panel  -     TSH W Reflex To Free T4  -     Urinalysis, Routine  -     PSA Total, Screen  6. Sensorineural hearing loss, bilateral stable  -     EKG 12 Lead; Future; Expected date: 03/25/2025  -     CBC With Differential With Platelet; Future; Expected date: 03/25/2025  -     Comp Metabolic Panel (14); Future; Expected date: 03/25/2025  -     Lipid Panel; Future; Expected date: 03/25/2025  -     TSH W Reflex To Free T4; Future; Expected date: 03/25/2025  -     Urinalysis, Routine; Future; Expected date: 03/25/2025  -     PSA Total, Screen; Future; Expected date: 03/25/2025  -     CBC With Differential With Platelet  -     Comp Metabolic Panel (14)  -     Lipid Panel  -     TSH W Reflex To Free T4  -     Urinalysis, Routine  -     PSA Total, Screen  7. Screening, lipid follow results.  -     CBC With Differential With Platelet; Future; Expected date: 03/25/2025  -     Comp Metabolic Panel (14); Future; Expected date: 03/25/2025  -     Lipid Panel; Future; Expected date: 03/25/2025  -     TSH W Reflex To Free T4; Future; Expected date: 03/25/2025  -     Urinalysis, Routine; Future; Expected date: 03/25/2025  -     PSA Total, Screen; Future; Expected date: 03/25/2025  -     CBC With Differential With Platelet  -     Comp Metabolic Panel (14)  -     Lipid Panel  -     TSH W Reflex To Free T4  -      Urinalysis, Routine  -     PSA Total, Screen  8. Abnormal findings on diagnostic imaging of other parts follow labs  -     Lipid Panel; Future; Expected date: 03/25/2025  -     Lipid Panel  9. Encounter for screening for malignant neoplasm of prostate check PSA  -     PSA Total, Screen; Future; Expected date: 03/25/2025  -     PSA Total, Screen    11. Anemia, unspecified type follow labs flu [place] 1 annual    The patient indicates understanding of these issues and agrees to the plan.  Reinforced healthy diet, lifestyle, and exercise.            No follow-ups on file.     Mello Xie MD, 3/30/2025     Supplementary Documentation:   General Health:  In the past six months, have you lost more than 10 pounds without trying?: 2 - No  Has your appetite been poor?: No  Type of Diet: Other  How does the patient maintain a good energy level?: Appropriate Exercise  How would you describe your daily physical activity?: Light  How would you describe your current health state?: Good  How do you maintain positive mental well-being?: Visiting Family, Visiting Friends  On a scale of 0 to 10, with 0 being no pain and 10 being severe pain, what is your pain level?: 0 - (None)  In the past six months, have you experienced urine leakage?: 0-No  At any time do you feel concerned for the safety/well-being of yourself and/or your children, in your home or elsewhere?: No  Have you had any immunizations at another office such as Influenza, Hepatitis B, Tetanus, or Pneumococcal?: No    Health Maintenance   Topic Date Due    Colorectal Cancer Screening  08/23/2024    COVID-19 Vaccine (7 - 2024-25 season) 09/01/2024    Annual Physical  09/27/2024    PSA  03/25/2027    Influenza Vaccine  Completed    Annual Depression Screening  Completed    Fall Risk Screening (Annual)  Completed    Pneumococcal Vaccine: 50+ Years  Completed    Zoster Vaccines  Completed    Meningococcal B Vaccine  Aged Out

## 2025-04-02 ENCOUNTER — NURSE TRIAGE (OUTPATIENT)
Dept: INTERNAL MEDICINE CLINIC | Facility: CLINIC | Age: 70
End: 2025-04-02

## 2025-04-02 LAB — AMB EXT COVID-19 RESULT: DETECTED

## 2025-04-02 NOTE — TELEPHONE ENCOUNTER
Action Requested: Summary for Provider     []  Critical Lab, Recommendations Needed  [] Need Additional Advice  [x]   FYI    []   Need Orders  [] Need Medications Sent to Pharmacy  []  Other     SUMMARY: Per protocol, patient should be able to manage symptoms with home care. Spouse requesting prescription for Paxlovid. Advised to schedule an appointment for evaluation. Video visit scheduled.    Future Appointments   Date Time Provider Department Center   4/2/2025  4:00 PM Delfin Hernandez MD [facility]     Reason for call: Covid  Onset: 4/2    Spouse reports patient tested positive for Covid today. Symptoms are runny nose,sneezing, bodyache, feels tired. He has not been feeling well for the past few days and has been taking regular cold medicines. States daughter tested positive for Covid yesterday.    Covid isolation guidelines per CDC and supportive home care instructions given and discussed. Verbalized understanding and agreed with plan of care.     Reason for Disposition   COVID-19 diagnosed by positive lab test (e.g., PCR, rapid self-test kit) and mild symptoms (e.g., cough, fever, others) and no complications or SOB    Protocols used: Coronavirus (COVID-19) Diagnosed or [id number]

## 2025-05-15 ENCOUNTER — OFFICE VISIT (OUTPATIENT)
Dept: DERMATOLOGY CLINIC | Facility: CLINIC | Age: 70
End: 2025-05-15
Payer: MEDICARE

## 2025-05-15 DIAGNOSIS — D23.9 BENIGN NEOPLASM OF SKIN, UNSPECIFIED LOCATION: ICD-10-CM

## 2025-05-15 DIAGNOSIS — L57.8 SUN-DAMAGED SKIN: ICD-10-CM

## 2025-05-15 DIAGNOSIS — L81.4 LENTIGO: ICD-10-CM

## 2025-05-15 DIAGNOSIS — L82.1 SK (SEBORRHEIC KERATOSIS): ICD-10-CM

## 2025-05-15 DIAGNOSIS — Z51.81 MEDICATION MONITORING ENCOUNTER: ICD-10-CM

## 2025-05-15 DIAGNOSIS — L57.0 AK (ACTINIC KERATOSIS): Primary | ICD-10-CM

## 2025-05-15 PROCEDURE — 99203 OFFICE O/P NEW LOW 30 MIN: CPT | Performed by: DERMATOLOGY

## 2025-05-15 RX ORDER — FLUOROURACIL 50 MG/G
CREAM TOPICAL
Qty: 40 G | Refills: 1 | Status: SHIPPED | OUTPATIENT
Start: 2025-05-15

## 2025-05-15 NOTE — PROGRESS NOTES
The following individual(s) verbally consented to be recorded using ambient AI listening technology and understand that they can each withdraw their consent to this listening technology at any point by asking the clinician to turn off or pause the recording:    Patient name: clayton Nicolas Katz  Additional names:  Tatum Griggs

## 2025-05-25 NOTE — PROGRESS NOTES
Art Katz is a 70 year old male.    CC:    Chief Complaint   Patient presents with    Full Skin Exam     New Patient, presents for FBSE.  Has c/o scaly lesions on B/L arms.   Denies personal Hx of skin cancer. Unsure of family Hx(adopted).         HISTORY:    Past Medical History[1]   Past Surgical History[2]   Family History[3]   Short Social Hx on File[4]     Current Medications[5]  Allergies:   Allergies[6]    Past Medical History[7]  Past Surgical History[8]  Social History     Socioeconomic History    Marital status:      Spouse name: Not on file    Number of children: Not on file    Years of education: Not on file    Highest education level: Not on file   Occupational History    Not on file   Tobacco Use    Smoking status: Former     Current packs/day: 0.00     Types: Cigarettes     Quit date: 2000     Years since quittin.8     Passive exposure: Never    Smokeless tobacco: Never   Vaping Use    Vaping status: Never Used   Substance and Sexual Activity    Alcohol use: Not Currently    Drug use: Not Currently    Sexual activity: Not on file   Other Topics Concern    Grew up [place] a farm Not Asked    History of tanning Not Asked    Outdoor occupation Yes    Reaction to local anesthetic No    Pt has a pacemaker No    Pt has a defibrillator No   Social History Narrative    Not on file     Social Drivers of Health     Food Insecurity: No Food Insecurity (10/25/2024)    Food Insecurity     Food Insecurity: Never true   Transportation Needs: No Transportation Needs (10/25/2024)    Transportation Needs     Lack of Transportation: No     Car Seat: Not on file   Stress: Not on file   Housing Stability: Low Risk  (10/25/2024)    Housing Stability     Housing Instability: No     Housing Instability Emergency: Not on file     Crib or [place]inette: Not on file     Family History[9]    HPI:     HPI:  Chief Complaint   Patient presents with    Full Skin Exam     New Patient, presents for FBSE.  Has c/o  scaly lesions on B/L arms.   Denies personal Hx of skin cancer. Unsure of family Hx(adopted).       History of Present Illness  Art Katz is a 70 year old male who presents with concerns about skin changes on his arms.    He has been experiencing changes in the quality and texture of the skin on his forearms over the past couple of years. The skin is rough and gritty, with the presence of age spots. He is concerned about these changes and notes that his skin is 'not doing as good as it used to.'    He has a history of significant sun exposure. He mentions that his mother's skin appeared worse as she aged, but he is unaware of any family history of skin cancer due to not knowing his biological father.    He recalls a previous skin problem on his face, which left the area sensitive and different in texture compared to the unaffected side. The affected side 'hurts a little bit.'    He is not currently using any specific treatments for his skin condition but is seeking professional advice to address his concerns.    No current symptoms suggestive of an acute skin condition, such as fever or systemic illness.    Patient presents with concerns above.    Patient has been in their usual state of health.     Past notes/ records and appropriate/relevant lab results including pathology and past body maps reviewed. Including outside notes/ PCP notes as appropriate. Updated and new information noted in current visit.     ROS:  Denies other relevant systemic complaints. See HPI.     History, medications, allergies reviewed as noted.    Allergies:  Seasonal      There were no vitals filed for this visit.    Physical Examination:     Well-developed well-nourished patient alert oriented in no acute distress.  Exam performed of appropriate involved areas    Physical Exam  SKIN: Freckles and age spots present. Moles present, appear benign. Cherry angiomas present. Ears normal. Rough red scaly spots on forearms, precancerous.  More rough red scaly spots on right hand than left. Face normal.    Multiple light to medium brown, well marginated, uniformly pigmented, macules and papules 6 mm and less scattered on exam. pigmented lesions examined with dermoscopy benign-appearing patterns.     Waxy tannish keratotic papules scattered, cherry-red vascular papules scattered.    See map today's date for lesions noted .  See assessment and plan below for specific lesions.    Otherwise remarkable for lesions as noted on map.    See A/P  below for additional information:    Assessment / plan:    Results  DIAGNOSTIC  Skin examination: Multiple lentigines, nevi, and actinic keratoses on forearms. No suspicious lesions. Solar damage noted. (05/15/2025)    No orders of the defined types were placed in this encounter.      Meds & Refills for this Visit:  Requested Prescriptions     Signed Prescriptions Disp Refills    fluorouracil 5 % External Cream 40 g 1     Sig: Use a small pea sized amount to each forearm/ right hand twice weekly at night as directed x 8 weeks for actinic keratoses         Encounter Diagnoses   Name Primary?    AK (actinic keratosis) Yes    Medication monitoring encounter     Sun-damaged skin     Lentigo     Benign neoplasm of skin, unspecified location     SK (seborrheic keratosis)      Fall risk assessment:  Given the results of the fall risk questionnaire given today.   Suggest:   Make sure there is adequate lighting.  Eliminate throw rugs or possible sources of tripping & falling  Consider grab bars on the shower & toilet.  Hand rails on all stair cases  Ambulatory assistance devices such as cane or walker as indicate.  To ensure home safety measures.    Assessment & Plan  Actinic keratosis  Presence of actinic keratosis on the forearms, characterized by rough, red, scaly spots. These are precancerous changes due to sun damage. Treatment is necessary to prevent progression.  - Prescribe chemotherapy cream to be applied twice a week  in the evening for two months on the arms.  - Instruct to use a small pea-sized amount of cream to avoid flu-like side effects such as fatigue, nausea, and headaches.  - Advise to monitor for excessive redness or irritation and report if symptoms are unbearable.    Sun damage  Significant sun damage on the forearms, with age spots and precancerous changes. No family history of skin cancer. The damage is not currently dangerous but requires treatment to prevent further issues.  - Recommend daily use of moisturizers with lactic acid to smooth the skin.  - Advise use of sunscreen moisturizer daily, especially in the summer.  - Educate on the importance of not picking at the skin to prevent further damage.      Actinic keratoses.  Precancerous nature discussed.  Treatment options reviewed at length we will proceed with topical therapy with   Efudex   twice weekly for   6   week course  of actual medication use and may alternate weeks if necessary.  Increased redness scaling crusting irritation pain tenderness potential discussed.  Anticipate one month for resolution of symptoms.  Plan recheck in one month after completion of treatment course.  Consider alternative treatment biopsy if not resolved.  Face arms hands.  bBenign-appearing nevi, no atypical features on dermoscopy reassurance given monitor.     Waxy tan keratotic papules lesions in areas of concern as noted reassurance given.  Benign nature discussed.  Possibility of cryo, alphahydroxy acids over-the-counter retinol's discussed.     No other susupicious lesions on todays  exam.    Please refer to map for specific lesions.  See additional diagnoses.  Pros cons of various therapies, risks benefits discussed.Pathophysiology, terapeutic options reviewed.  See  Medications in grid.  Instructions reviewed at length.    Benign nevi, seborrheic  keratoses, cherry angiomas:  Reassurance regarding other benign skin lesions.    Monitor for new or changing lesions. Signs  and symptoms of skin cancer, ABCDE's of melanoma ( additional information available at AAD.org, skincancer.org) Encourage Sunscreen (broad-spectrum, ideally mineral-based-UVA/UVB -SPF 30 or higher) use encouraged, sun protection/sun protective clothing, self exams reviewed Followup as noted RTC ---routine checkup 6 mos -one year or p.r.n.    Encounter Times   Including precharting, reviewing chart, prior notes obtaining history: 10 minutes, medical exam :10 minutes, notes on body map, plan, counseling 10minutes My total time spent caring for the patient on the day of the encounter: 30 minutes     The patient indicates understanding of these issues and agrees to the plan.  The patient is asked to return as noted in follow-up/ above.    This note was generated using Dragon voice recognition software.  Please contact me regarding any confusion resulting from errors in recognition..  Note to patient and family: The 21st Century Cures Act makes medical notes like these available to patients. However, be advised this is a medical document. It is intended as [id number] communication and monitoring of a patient's care needs. It is written in medical language and may contain abbreviations or verbiage that are unfamiliar. It may appear blunt or direct. Medical documents are intended to carry relevant information, facts as evident and the clinical opinion of the practitioner.         [1]   Past Medical History:   Back problem    Calculus of kidney    COVID    Hearing impaired person, bilateral    HEARING AIDS    Kidney stone    Obesity    Sleep apnea    NO TX    Visual impairment    GLASSES   [2]   Past Surgical History:  Procedure Laterality Date    Cyst removal  2019    CYST REMOVED FROM TESTICLE    Gastric bypass,obese<100cm bill-en-y  2010   [3]   Family History  Problem Relation Age of Onset    Stroke Father     Cancer Mother     Dementia Mother     Dementia Maternal Grandfather     Depression Paternal Grandmother      Other (Other) Brother         Brain tumor    Other (other ) Brother         seizure   [4]   Social History  Socioeconomic History    Marital status:    Tobacco Use    Smoking status: Former     Current packs/day: 0.00     Types: Cigarettes     Quit date: 2000     Years since quittin.8     Passive exposure: Never    Smokeless tobacco: Never   Vaping Use    Vaping status: Never Used   Substance and Sexual Activity    Alcohol use: Not Currently    Drug use: Not Currently   Other Topics Concern    Outdoor occupation Yes    Reaction to local anesthetic No    Pt has a pacemaker No    Pt has a defibrillator No     Social Drivers of Health     Food Insecurity: No Food Insecurity (10/25/2024)    Food Insecurity     Food Insecurity: Never true   Transportation Needs: No Transportation Needs (10/25/2024)    Transportation Needs     Lack of Transportation: No   Housing Stability: Low Risk  (10/25/2024)    Housing Stability     Housing Instability: No   [5]   Current Outpatient Medications   Medication Sig Dispense Refill    fluorouracil 5 % External Cream Use a small pea sized amount to each forearm/ right hand twice weekly at night as directed x 8 weeks for actinic keratoses 40 g 1    omega-3 fatty acids 1000 MG Oral Cap Take 1,000 mg by mouth daily.      cetirizine 10 MG Oral Tab Take 1 tablet (10 mg total) by mouth daily.      Multiple Vitamin (ONE-DAILY MULTI VITAMINS) Oral Tab Take 1 tablet by mouth daily.      vitamin E 1000 UNITS Oral Cap Take 1 capsule (1,000 Units total) by mouth daily.     [6]   Allergies  Allergen Reactions    Seasonal OTHER (SEE COMMENTS)     Runny nose   [7]   Past Medical History:   Back problem    Calculus of kidney    COVID    Hearing impaired person, bilateral    HEARING AIDS    Kidney stone    Obesity    Sleep apnea    NO TX    Visual impairment    GLASSES   [8]   Past Surgical History:  Procedure Laterality Date    Cyst removal      CYST REMOVED FROM TESTICLE    Gastric  bypass,obese<100cm bill-en-y  2010   [9]   Family History  Problem Relation Age of Onset    Stroke Father     Cancer Mother     Dementia Mother     Dementia Maternal Grandfather     Depression Paternal Grandmother     Other (Other) Brother         Brain tumor    Other (other ) Brother         seizure

## 2025-07-16 ENCOUNTER — NURSE TRIAGE (OUTPATIENT)
Dept: INTERNAL MEDICINE CLINIC | Facility: CLINIC | Age: 70
End: 2025-07-16

## 2025-07-16 NOTE — TELEPHONE ENCOUNTER
Action Requested: Summary for Provider     []  Critical Lab, Recommendations Needed  [] Need Additional Advice  []   FYI    []   Need Orders  [] Need Medications Sent to Pharmacy  [x]  Other     SUMMARY: Wife of patient (on release of information) calling to state that patient has had an issue with tinnitus for years, however, that over the last couple of days, the tinnitus has been worse and that patient has also had some dizziness with position changes.    She denies that he has any fevers or any other symptoms at this time.    She is requesting appointment for patient to be evaluated.    Appointment scheduled:  Future Appointments   Date Time Provider Department Center   7/17/2025  1:30 PM Verónica Xie MD [facility]  [place]   8/28/2025 12:30 [facility]   12/5/2025 10:30 AM LMB CT RM1 [facility] CT [facility] [place]   2/4/2026 10:30 AM Bert Norton MD [id number] [place] [facility]       Reason for call: Acute  Onset: Data Unavailable      Reason for Disposition   MODERATE dizziness (e.g., interferes with normal activities)  (Exception: Dizziness caused by heat exposure, sudden standing, or poor fluid intake.)    Protocols used: Dizziness-A-OH

## 2025-07-17 ENCOUNTER — OFFICE VISIT (OUTPATIENT)
Dept: INTERNAL MEDICINE CLINIC | Facility: CLINIC | Age: 70
End: 2025-07-17

## 2025-07-17 VITALS
OXYGEN SATURATION: 98 % | DIASTOLIC BLOOD PRESSURE: 80 MMHG | WEIGHT: 307 LBS | SYSTOLIC BLOOD PRESSURE: 135 MMHG | BODY MASS INDEX: 46.53 KG/M2 | HEART RATE: 68 BPM | HEIGHT: 68 IN

## 2025-07-17 DIAGNOSIS — R26.81 UNSTEADY GAIT: Primary | ICD-10-CM

## 2025-07-17 DIAGNOSIS — R42 DIZZINESS: ICD-10-CM

## 2025-07-17 DIAGNOSIS — T78.40XD ALLERGIC DISORDER, SUBSEQUENT ENCOUNTER: ICD-10-CM

## 2025-07-17 PROCEDURE — 99214 OFFICE O/P EST MOD 30 MIN: CPT | Performed by: INTERNAL MEDICINE

## 2025-07-17 RX ORDER — FLUTICASONE PROPIONATE 50 MCG
2 SPRAY, SUSPENSION (ML) NASAL DAILY
Qty: 1 EACH | Refills: 0 | Status: SHIPPED | OUTPATIENT
Start: 2025-07-17 | End: 2025-07-21

## 2025-07-17 RX ORDER — MONTELUKAST SODIUM 10 MG/1
10 TABLET ORAL DAILY
Qty: 30 TABLET | Refills: 0 | Status: SHIPPED | OUTPATIENT
Start: 2025-07-17 | End: 2025-07-21

## 2025-07-17 NOTE — PROGRESS NOTES
Subjective:     Patient ID: Art Katz is a 70 year old male.    Dizziness    Tinnitus        History/Other: He came in today due to tinnitus and unsteadiness/dizziness.  According to the patient tinnitus and unsteadiness is ongoing for a long time he was seen by ENT couple of months ago had CT scan of the sinuses.  He states that for last couple of days his tinnitus is worse.  He denies any runny nose.  He is always congested.  He does not feel like room is spinning but he feels like unsteady on his gait.  Review of Systems   Constitutional: Negative.    HENT:  Positive for tinnitus.    Eyes: Negative.    Respiratory: Negative.     Cardiovascular: Negative.    Gastrointestinal: Negative.    Endocrine: Negative.    Genitourinary: Negative.    Musculoskeletal: Negative.    Neurological:  Positive for dizziness.   Hematological: Negative.    Psychiatric/Behavioral: Negative.       Current Medications[1]  Allergies:Allergies[2]    Past Medical History[3]   Past Surgical History[4]   Family History[5]   Social History: Short Social Hx on File[6]     Objective:   Physical Exam  Vitals and nursing note reviewed.   Constitutional:       Appearance: Normal appearance.   HENT:      Head: Normocephalic and atraumatic.   Cardiovascular:      Rate and Rhythm: Normal rate and regular rhythm.      Pulses: Normal pulses.      Heart sounds: Normal heart sounds.   Pulmonary:      Effort: Pulmonary effort is normal.      Breath sounds: Normal breath sounds.   Abdominal:      Palpations: Abdomen is soft.   Musculoskeletal:      Cervical back: Normal range of motion and neck supple.   Skin:     General: Skin is warm and dry.   Neurological:      Mental Status: He is alert. Mental status is at baseline.      Cranial Nerves: No cranial nerve deficit.      Sensory: No sensory deficit.      Motor: No weakness.      Coordination: Coordination normal.   Psychiatric:         Mood and Affect: Mood normal.         Assessment & Plan:    1. Dizziness /unsteady gait I did review ENT note I did advise him to follow-up with neurology meantime I will refer him for balance therapy, fall precautions   2 Tinnitus/worse last few days could be due to allergies I did advise him to continue with cetirizine I did advise him to start using Flonase nasal spray, I will also prescribe montelukast, follow-up with allergy specialist as well   3.        No orders of the defined types were placed in this encounter.      Meds This Visit:  Requested Prescriptions     Signed Prescriptions Disp Refills    montelukast (SINGULAIR) 10 MG Oral Tab 30 tablet 0     Sig: Take 1 tablet (10 mg total) by mouth daily.    fluticasone propionate 50 MCG/ACT Nasal Suspension 1 each 0     Si sprays by Each Nare route daily.       Imaging & Referrals:  NEURO - INTERNAL  ALLERGY - INTERNAL  PHYSICAL THERAPY - INTERNAL            [1]   Current Outpatient Medications   Medication Sig Dispense Refill    montelukast (SINGULAIR) 10 MG Oral Tab Take 1 tablet (10 mg total) by mouth daily. 30 tablet 0    fluticasone propionate 50 MCG/ACT Nasal Suspension 2 sprays by Each Nare route daily. 1 each 0    omega-3 fatty acids 1000 MG Oral Cap Take 1,000 mg by mouth daily.      cetirizine 10 MG Oral Tab Take 1 tablet (10 mg total) by mouth daily.      Multiple Vitamin (ONE-DAILY MULTI VITAMINS) Oral Tab Take 1 tablet by mouth daily.      vitamin E 1000 UNITS Oral Cap Take 1 capsule (1,000 Units total) by mouth daily.     [2]   Allergies  Allergen Reactions    Seasonal OTHER (SEE COMMENTS)     Runny nose   [3]   Past Medical History:   Back problem    Calculus of kidney    COVID    Hearing impaired person, bilateral    HEARING AIDS    Kidney stone    Obesity    Sleep apnea    NO TX    Visual impairment    GLASSES   [4]   Past Surgical History:  Procedure Laterality Date    Cyst removal      CYST REMOVED FROM TESTICLE    Gastric bypass,obese<100cm bill-en-y     [5]   Family History  Problem  Relation Age of Onset    Stroke Father     Cancer Mother     Dementia Mother     Dementia Maternal Grandfather     Depression Paternal Grandmother     Other (Other) Brother         Brain tumor    Other (other ) Brother         seizure   [6]   Social History  Socioeconomic History    Marital status:    Tobacco Use    Smoking status: Former     Current packs/day: 0.00     Types: Cigarettes     Quit date: 2000     Years since quittin.0     Passive exposure: Never    Smokeless tobacco: Never   Vaping Use    Vaping status: Never Used   Substance and Sexual Activity    Alcohol use: Not Currently    Drug use: Not Currently   Other Topics Concern    Outdoor occupation Yes    Reaction to local anesthetic No    Pt has a pacemaker No    Pt has a defibrillator No     Social Drivers of Health     Food Insecurity: No Food Insecurity (10/25/2024)    Food Insecurity     Food Insecurity: Never true   Transportation Needs: No Transportation Needs (10/25/2024)    Transportation Needs     Lack of Transportation: No   Housing Stability: Low Risk  (10/25/2024)    Housing Stability     Housing Instability: No

## 2025-07-21 RX ORDER — MONTELUKAST SODIUM 10 MG/1
10 TABLET ORAL DAILY
Qty: 90 TABLET | Refills: 0 | Status: SHIPPED | OUTPATIENT
Start: 2025-07-21

## 2025-07-21 RX ORDER — FLUTICASONE PROPIONATE 50 MCG
2 SPRAY, SUSPENSION (ML) NASAL DAILY
Qty: 48 G | Refills: 0 | Status: SHIPPED | OUTPATIENT
Start: 2025-07-21

## (undated) DEVICE — PACK PBDS CYSTOSCOPY

## (undated) DEVICE — ADAPTER BX SEAL Y BIOPSY PORT ADJ FOR HYSTEROSCOPE

## (undated) DEVICE — 20 ML SYRINGE LUER-LOCK TIP: Brand: MONOJECT

## (undated) DEVICE — SYRINGE MED 20ML STD CLR PLAS LL TIP N CTRL

## (undated) DEVICE — CATHETER URET 5FR L70CM FLX OPN TIP NONPORTED

## (undated) DEVICE — SINGLE-USE DIGITAL FLEXIBLE URETEROSCOPE: Brand: LITHOVUE

## (undated) DEVICE — NITINOL WIRE WITH HYDROPHILIC TIP: Brand: SENSOR

## (undated) DEVICE — SOLUTION IV 1000ML 0.9% NACL PRESERVATIVE

## (undated) DEVICE — SLEEVE COMPR MD KNEE LEN SGL USE KENDALL SCD

## (undated) DEVICE — GLOVE SUR 7.5 SENSICARE PI PIP CRM PWD F

## (undated) DEVICE — NITINOL STONE RETRIEVAL BASKET: Brand: ZERO TIP

## (undated) DEVICE — GUIDEWIRE URO L150CM DIA0.035IN 3CM STR

## (undated) DEVICE — RETRIEVAL DEPLOYMENT DEVICE: Brand: LITHOVUE EMPOWER™

## (undated) DEVICE — PACK CUSTOM CYSTO

## (undated) DEVICE — UROLOGY DRAIN BAG

## (undated) DEVICE — SOLUTION IRRIG 3000ML 0.9% NACL FLX CONT

## (undated) DEVICE — FLEXIVA  PULSE  AND  FLEXIVA  PULSE  TRACTIP  LASER  FIBERS  ARE  HIGH  POWER  SINGLE-USE FIBER: Brand: FLEXIVA PULSE ID

## (undated) DEVICE — SINGLE ACTION PUMPING SYSTEM

## (undated) DEVICE — SYSTEM TRNSF 39X49IN MOB AIR HALFMATS

## (undated) DEVICE — SYRINGE MED 10ML LL TIP W/O SFTY DISP

## (undated) DEVICE — ADHESIVE LIQ 2/3ML VI MASTISOL

## (undated) DEVICE — GAMMEX® NON-LATEX PI SIZE 7.5, STERILE POLYISOPRENE POWDER-FREE SURGICAL GLOVE: Brand: GAMMEX

## (undated) DEVICE — URETERAL ACCESS SHEATH SET: Brand: NAVIGATOR HD

## (undated) DEVICE — DILATOR/SHEATH SET: Brand: 8/10 DILATOR/SHEATH SET